# Patient Record
Sex: FEMALE | Race: WHITE | NOT HISPANIC OR LATINO | Employment: PART TIME | URBAN - METROPOLITAN AREA
[De-identification: names, ages, dates, MRNs, and addresses within clinical notes are randomized per-mention and may not be internally consistent; named-entity substitution may affect disease eponyms.]

---

## 2017-11-01 ENCOUNTER — TRANSCRIBE ORDERS (OUTPATIENT)
Dept: ADMINISTRATIVE | Facility: HOSPITAL | Age: 72
End: 2017-11-01

## 2017-11-01 DIAGNOSIS — Z78.0 POSTMENOPAUSAL: ICD-10-CM

## 2017-11-01 DIAGNOSIS — Z12.31 VISIT FOR SCREENING MAMMOGRAM: Primary | ICD-10-CM

## 2017-12-20 ENCOUNTER — HOSPITAL ENCOUNTER (OUTPATIENT)
Dept: BONE DENSITY | Facility: HOSPITAL | Age: 72
Discharge: HOME | End: 2017-12-20

## 2017-12-20 ENCOUNTER — HOSPITAL ENCOUNTER (OUTPATIENT)
Dept: MAMMOGRAPHY | Facility: HOSPITAL | Age: 72
Discharge: HOME | End: 2017-12-20
Admitting: PHYSICIAN ASSISTANT

## 2017-12-20 DIAGNOSIS — Z12.31 VISIT FOR SCREENING MAMMOGRAM: ICD-10-CM

## 2017-12-20 DIAGNOSIS — Z78.0 POSTMENOPAUSAL: ICD-10-CM

## 2017-12-20 PROCEDURE — 77063 BREAST TOMOSYNTHESIS BI: CPT

## 2017-12-20 PROCEDURE — 77080 DXA BONE DENSITY AXIAL: CPT

## 2017-12-20 PROCEDURE — G0202 SCR MAMMO BI INCL CAD: HCPCS

## 2017-12-20 PROCEDURE — 77063 BREAST TOMOSYNTHESIS BI: CPT | Performed by: RADIOLOGY

## 2017-12-20 PROCEDURE — G0202 SCR MAMMO BI INCL CAD: HCPCS | Performed by: RADIOLOGY

## 2018-11-09 ENCOUNTER — TRANSCRIBE ORDERS (OUTPATIENT)
Dept: ADMINISTRATIVE | Facility: HOSPITAL | Age: 73
End: 2018-11-09

## 2018-11-09 DIAGNOSIS — Z12.31 VISIT FOR SCREENING MAMMOGRAM: Primary | ICD-10-CM

## 2019-01-09 ENCOUNTER — HOSPITAL ENCOUNTER (OUTPATIENT)
Dept: MAMMOGRAPHY | Facility: HOSPITAL | Age: 74
Discharge: HOME | End: 2019-01-09
Attending: INTERNAL MEDICINE | Admitting: INTERNAL MEDICINE

## 2019-01-09 DIAGNOSIS — Z12.31 VISIT FOR SCREENING MAMMOGRAM: ICD-10-CM

## 2019-01-09 PROCEDURE — 77063 BREAST TOMOSYNTHESIS BI: CPT

## 2019-01-09 PROCEDURE — 77067 SCR MAMMO BI INCL CAD: CPT | Performed by: RADIOLOGY

## 2019-01-09 PROCEDURE — 77067 SCR MAMMO BI INCL CAD: CPT

## 2019-01-09 PROCEDURE — 77063 BREAST TOMOSYNTHESIS BI: CPT | Performed by: RADIOLOGY

## 2019-03-13 RX ORDER — SIMVASTATIN 20 MG
TABLET ORAL
Qty: 90 TABLET | Refills: 3 | Status: SHIPPED | OUTPATIENT
Start: 2019-03-13 | End: 2020-05-28 | Stop reason: SDUPTHER

## 2019-05-06 PROBLEM — H61.22 LEFT EAR IMPACTED CERUMEN: Status: ACTIVE | Noted: 2019-05-06

## 2019-05-06 PROBLEM — G47.00 INSOMNIA, UNSPECIFIED: Status: ACTIVE | Noted: 2019-05-06

## 2019-05-06 PROBLEM — F10.90 ALCOHOL USE: Status: ACTIVE | Noted: 2019-05-06

## 2019-05-06 PROBLEM — M81.0 OSTEOPOROSIS: Status: ACTIVE | Noted: 2019-05-06

## 2019-05-06 PROBLEM — E78.5 HYPERLIPIDEMIA: Status: ACTIVE | Noted: 2019-05-06

## 2019-05-06 PROBLEM — M19.90 OSTEOARTHRITIS: Status: ACTIVE | Noted: 2019-05-06

## 2019-05-06 PROBLEM — Z12.31 SCREENING MAMMOGRAM, ENCOUNTER FOR: Status: ACTIVE | Noted: 2019-05-06

## 2019-05-06 PROBLEM — Z78.0 POST-MENOPAUSAL: Status: ACTIVE | Noted: 2019-05-06

## 2019-05-06 PROBLEM — M15.9 PRIMARY OSTEOARTHRITIS INVOLVING MULTIPLE JOINTS: Status: ACTIVE | Noted: 2019-05-06

## 2019-05-06 PROBLEM — H26.9 EARLY CATARACT: Status: ACTIVE | Noted: 2019-05-06

## 2019-05-06 PROBLEM — I10 ESSENTIAL HYPERTENSION: Status: ACTIVE | Noted: 2019-05-06

## 2019-05-06 PROBLEM — M40.209 KYPHOSIS, ACQUIRED: Status: ACTIVE | Noted: 2019-05-06

## 2019-05-06 PROBLEM — K63.5 POLYP OF COLON: Status: ACTIVE | Noted: 2019-05-06

## 2019-05-06 PROBLEM — E55.9 VITAMIN D DEFICIENCY: Status: ACTIVE | Noted: 2019-05-06

## 2019-05-06 PROBLEM — M15.0 PRIMARY OSTEOARTHRITIS INVOLVING MULTIPLE JOINTS: Status: ACTIVE | Noted: 2019-05-06

## 2019-05-06 PROBLEM — B39.9 HISTOPLASMOSIS: Status: ACTIVE | Noted: 2019-05-06

## 2019-05-06 PROBLEM — Z00.00 MEDICARE ANNUAL WELLNESS VISIT, SUBSEQUENT: Status: ACTIVE | Noted: 2019-05-06

## 2019-05-06 PROBLEM — Z78.9 ALCOHOL USE: Status: ACTIVE | Noted: 2019-05-06

## 2019-05-06 PROBLEM — M85.80 OSTEOPENIA: Status: ACTIVE | Noted: 2019-05-06

## 2019-05-22 ENCOUNTER — OFFICE VISIT (OUTPATIENT)
Dept: INTERNAL MEDICINE | Facility: CLINIC | Age: 74
End: 2019-05-22

## 2019-05-22 ENCOUNTER — LAB REQUISITION (OUTPATIENT)
Dept: LAB | Facility: HOSPITAL | Age: 74
End: 2019-05-22

## 2019-05-22 VITALS
SYSTOLIC BLOOD PRESSURE: 140 MMHG | HEIGHT: 64 IN | BODY MASS INDEX: 22.71 KG/M2 | HEART RATE: 72 BPM | DIASTOLIC BLOOD PRESSURE: 80 MMHG | WEIGHT: 133 LBS

## 2019-05-22 DIAGNOSIS — R10.30 LOWER ABDOMINAL PAIN: Primary | ICD-10-CM

## 2019-05-22 DIAGNOSIS — Z00.00 ROUTINE GENERAL MEDICAL EXAMINATION AT A HEALTH CARE FACILITY: ICD-10-CM

## 2019-05-22 DIAGNOSIS — E78.2 MIXED HYPERLIPIDEMIA: ICD-10-CM

## 2019-05-22 DIAGNOSIS — I10 ESSENTIAL HYPERTENSION: ICD-10-CM

## 2019-05-22 PROCEDURE — 36415 COLL VENOUS BLD VENIPUNCTURE: CPT | Performed by: INTERNAL MEDICINE

## 2019-05-22 PROCEDURE — 99214 OFFICE O/P EST MOD 30 MIN: CPT | Performed by: INTERNAL MEDICINE

## 2019-05-22 RX ORDER — MELATONIN
1000 DAILY
COMMUNITY

## 2019-05-22 RX ORDER — MULTIPLE VITAMINS W/ MINERALS TAB 9MG-400MCG
1 TAB ORAL DAILY
COMMUNITY

## 2019-05-22 RX ORDER — LISINOPRIL 20 MG/1
20 TABLET ORAL DAILY
COMMUNITY
End: 2020-01-30

## 2019-05-22 NOTE — ASSESSMENT & PLAN NOTE
.  Patient has a history of mixed hyperlipidemia on simvastatin 20 mg daily denies myalgia or arthralgia continue current therapy lab pending

## 2019-05-22 NOTE — PROGRESS NOTES
[facility]     Lucia Mc  1945   [id number]      Patient Care Team:  Wei Skelton MD as PCP - General  Wei Skelton MD as PCP - Family Medicine    Chief Complaint::   Chief Complaint   Patient presents with   • abdominal discomfort     bloating.  Denies change in stools.             HPI  Patient is a 24-year-old female complains of low abdominal fullness bloating discomfort the past 2 to 3 months is noted no change in her bowel habits consistency of the bowel movement no change in bladder or GYN function he is remote appendectomy and  x2 she has gained a 3 to 4 pounds over the last few months injury colonoscopy was 2011-normal.  In addition the patient has a history of essential hypertension good control of mixed hyperlipidemia good control.      Patient Active Problem List   Diagnosis   • BMI 22.0-22.9, adult   • Insomnia, unspecified   • Osteoarthritis   • Post-menopausal   • Screening mammogram, encounter for   • Osteoporosis   • Hyperlipidemia   • Primary osteoarthritis involving multiple joints   • Early cataract   • Essential hypertension   • Vitamin D deficiency   • Left ear impacted cerumen   • Osteopenia   • Medicare annual wellness visit, subsequent   • Polyp of colon   • Alcohol use   • Kyphosis, acquired   • Histoplasmosis   • Lower abdominal pain        No past medical history on file.    Past Surgical History:   Procedure Laterality Date   • APPENDECTOMY     •  SECTION      TIMES 2   • COLONOSCOPY  2009   • OVARIAN CYST SURGERY     • TUBAL ABDOMINAL LIGATION Bilateral        Family History   Problem Relation Age of Onset   • Breast cancer Maternal Aunt 70   • Pancreatic cancer Other    • COPD Other    • Diabetes Other    • Ovarian cancer Neg Hx        Social History     Socioeconomic History   • Marital status:      Spouse name: Not on file   • Number of children: Not on file   • Years of education: Not on file   • Highest education level:  "Not on file   Tobacco Use   • Smoking status: Former Smoker     Last attempt to quit: 1964     Years since quittin.4   • Tobacco comment: 1 CIGARETTES         No Known Allergies    Review of Systems     HEENT: Denies headache dizzy ear nose mouth or throat difficulties does have maturing cataracts surgery this fall  NECK: Denies pain stiffness swelling dysphasia or reflux  CHEST: Denies cough or wheeze denies recent pulmonary infections  CARDIAC: Denies chest pain or pressure  ABD: Denies upper abdominal discomfort pain nausea or vomiting  : Dysuria frequency  NEURO: Denies syncope concussion or neuropathy  PSYCH: Denies anxiety depression  EXTREM: Complains of arthritic soreness mild without edema    Vital Signs  Vitals:    19 1110   BP: 140/80   Pulse: 72   Weight: 60.3 kg (133 lb)   Height: 162.6 cm (64\")   PainSc:   2     Body mass index is 22.83 kg/m².      Current Outpatient Medications:   •  cholecalciferol (VITAMIN D3) 1000 units tablet, Take 1,000 Units by mouth Daily., Disp: , Rfl:   •  lisinopril (PRINIVIL,ZESTRIL) 20 MG tablet, Take 20 mg by mouth Daily., Disp: , Rfl:   •  Multiple Vitamins-Minerals (MULTIVITAMIN WITH MINERALS) tablet tablet, Take 1 tablet by mouth Daily., Disp: , Rfl:   •  simvastatin (ZOCOR) 20 MG tablet, TAKE 1 TABLET EVERY EVENING, Disp: 90 tablet, Rfl: 3    Physical Exam     ACE III MINI         HEENT: Pupils equal reactive ENT clear no facial asymmetry  NECK: No masses bruits or thyromegaly megaly or neck vein distention  CHEST: Clear to P&A  CARDIAC: Regular rhythm without gallop or rub  ABD: Liver and spleen normal positive bowel sounds no bruits no masses guarding or rebound  :   NEURO: CNS is intact no neuropathy  PSYCH: No evidence of anxiety depression  EXTREM: Minimal arthritic changes no edema  Skin: Clear     Results Review:    CBC CMP and lipid panel are pending  Procedures    Medication Review: Medications reviewed and noted    Patient wellness " counseling  Exercise: Continue walking exercise  Diet: Continue healthy cardiac diet  Smoking: Non-smoker  Alcohol: Infrequent alcohol  Screening: We will arrange GYN colonoscopy and CT    Assessment/Plan:    Problem List Items Addressed This Visit        Cardiovascular and Mediastinum    Hyperlipidemia    Current Assessment & Plan     .  Patient has a history of mixed hyperlipidemia on simvastatin 20 mg daily denies myalgia or arthralgia continue current therapy lab pending         Relevant Medications    simvastatin (ZOCOR) 20 MG tablet    Other Relevant Orders    Lipid Panel    Essential hypertension    Current Assessment & Plan     Patient has a history of essential hypertension currently stable on lisinopril 20 mg daily denies headache or cough to continue therapy blood pressure repeated 134/76 left and right sitting         Relevant Medications    lisinopril (PRINIVIL,ZESTRIL) 20 MG tablet    Other Relevant Orders    CBC & Differential    Comprehensive Metabolic Panel       Nervous and Auditory    Lower abdominal pain - Primary    Overview     She is noted the last 2 to 3 months some lower abdominal pain with bloating and fullness feels full diet medication or activity she has gained 3 to 4 pounds over the past few months she is remote  section x2 and appendectomy her last colonoscopy was by Dr. Bauer 2010-normal denies change in the quality of her bowel movements denies bladder problems GYN problems.         Current Assessment & Plan     Exam of abdomen is normal.  No guarding masses rebound or tenderness.  We will obtain lab BC CMP obtain CT scan of abdomen pelvis with and without colonoscopy and GYN exam consultation  and  are best she continues to work through  at [employer]         Relevant Orders    CT Abdomen Pelvis With Contrast    Ambulatory Referral to Gastroenterology    Ambulatory Referral to Obstetrics / Gynecology           Patient Instructions   Lab work  of CBC CMP and lipid are pending  Arrange colonoscopy by Dr. Jose Bauer at all  Arrange GYN appointment with [facility]  Arrange CT scan of abdomen pelvis with and without  Follow-up visit in 2 months she continues to work at [employer] in [place] through the month of June but is available for these consultations on Mondays and Wednesdays         Plan of care reviewed with patient at the conclusion of today's visit. Education was provided regarding diagnosis, management, and any prescribed or recommended OTC medications.Patient verbalizes understanding of and agreement with management plan.         Wei Skelton MD      Note: Part of this note may be an electronic transcription/translation of spoken language to printed text using the Dragon Dictation system.

## 2019-05-22 NOTE — ASSESSMENT & PLAN NOTE
Exam of abdomen is normal.  No guarding masses rebound or tenderness.  We will obtain lab BC CMP obtain CT scan of abdomen pelvis with and without colonoscopy and GYN exam consultation Mondays and Wednesdays are best she continues to work through June at [employer]

## 2019-05-22 NOTE — ASSESSMENT & PLAN NOTE
Patient has a history of essential hypertension currently stable on lisinopril 20 mg daily denies headache or cough to continue therapy blood pressure repeated 134/76 left and right sitting

## 2019-05-23 LAB
ALBUMIN SERPL-MCNC: 4.4 G/DL (ref 3.5–5.2)
ALBUMIN/GLOB SERPL: 1.8 G/DL
ALP SERPL-CCNC: 83 U/L (ref 39–117)
ALT SERPL-CCNC: 15 U/L (ref 1–33)
AST SERPL-CCNC: 22 U/L (ref 1–32)
BASOPHILS # BLD AUTO: 0.05 10*3/MM3 (ref 0–0.2)
BASOPHILS NFR BLD AUTO: 1 % (ref 0–1.5)
BILIRUB SERPL-MCNC: 0.6 MG/DL (ref 0.2–1.2)
BUN SERPL-MCNC: 12 MG/DL (ref 8–23)
BUN/CREAT SERPL: 16.4 (ref 7–25)
CALCIUM SERPL-MCNC: 10.3 MG/DL (ref 8.6–10.5)
CHLORIDE SERPL-SCNC: 101 MMOL/L (ref 98–107)
CHOLEST SERPL-MCNC: 195 MG/DL (ref 0–200)
CO2 SERPL-SCNC: 28.8 MMOL/L (ref 22–29)
CREAT SERPL-MCNC: 0.73 MG/DL (ref 0.57–1)
EOSINOPHIL # BLD AUTO: 0.1 10*3/MM3 (ref 0–0.4)
EOSINOPHIL NFR BLD AUTO: 1.9 % (ref 0.3–6.2)
ERYTHROCYTE [DISTWIDTH] IN BLOOD BY AUTOMATED COUNT: 12.9 % (ref 12.3–15.4)
GLOBULIN SER CALC-MCNC: 2.4 GM/DL
GLUCOSE SERPL-MCNC: 93 MG/DL (ref 65–99)
HCT VFR BLD AUTO: 39.3 % (ref 34–46.6)
HDLC SERPL-MCNC: 101 MG/DL (ref 40–60)
HGB BLD-MCNC: 12.5 G/DL (ref 12–15.9)
IMM GRANULOCYTES # BLD AUTO: 0.01 10*3/MM3 (ref 0–0.05)
IMM GRANULOCYTES NFR BLD AUTO: 0.2 % (ref 0–0.5)
LDLC SERPL CALC-MCNC: 80 MG/DL (ref 0–100)
LYMPHOCYTES # BLD AUTO: 1.57 10*3/MM3 (ref 0.7–3.1)
LYMPHOCYTES NFR BLD AUTO: 30 % (ref 19.6–45.3)
MCH RBC QN AUTO: 31.5 PG (ref 26.6–33)
MCHC RBC AUTO-ENTMCNC: 31.8 G/DL (ref 31.5–35.7)
MCV RBC AUTO: 99 FL (ref 79–97)
MONOCYTES # BLD AUTO: 0.52 10*3/MM3 (ref 0.1–0.9)
MONOCYTES NFR BLD AUTO: 9.9 % (ref 5–12)
NEUTROPHILS # BLD AUTO: 2.98 10*3/MM3 (ref 1.7–7)
NEUTROPHILS NFR BLD AUTO: 57 % (ref 42.7–76)
NRBC BLD AUTO-RTO: 0 /100 WBC (ref 0–0.2)
PLATELET # BLD AUTO: 229 10*3/MM3 (ref 140–450)
POTASSIUM SERPL-SCNC: 4.6 MMOL/L (ref 3.5–5.2)
PROT SERPL-MCNC: 6.8 G/DL (ref 6–8.5)
RBC # BLD AUTO: 3.97 10*6/MM3 (ref 3.77–5.28)
SODIUM SERPL-SCNC: 141 MMOL/L (ref 136–145)
TRIGL SERPL-MCNC: 70 MG/DL (ref 0–150)
VLDLC SERPL CALC-MCNC: 14 MG/DL
WBC # BLD AUTO: 5.23 10*3/MM3 (ref 3.4–10.8)

## 2019-05-23 NOTE — PATIENT INSTRUCTIONS
Lab work of CBC CMP and lipid are pending  Arrange colonoscopy by Dr. Jose Bauer at all  Arrange GYN appointment with [facility]  Arrange CT scan of abdomen pelvis with and without  Follow-up visit in 2 months she continues to work at [employer] in [place] through the month of June but is available for these consultations on Mondays and Wednesdays

## 2019-06-10 ENCOUNTER — HOSPITAL ENCOUNTER (OUTPATIENT)
Dept: CT IMAGING | Facility: HOSPITAL | Age: 74
Discharge: HOME | End: 2019-06-10
Admitting: INTERNAL MEDICINE

## 2019-06-10 DIAGNOSIS — Z12.11 SCREENING FOR COLON CANCER: Primary | ICD-10-CM

## 2019-06-10 DIAGNOSIS — R10.30 LOWER ABDOMINAL PAIN: ICD-10-CM

## 2019-06-10 PROCEDURE — 25010000002 IOPAMIDOL 61 % SOLUTION: Performed by: INTERNAL MEDICINE

## 2019-06-10 PROCEDURE — 74178 CT ABD&PLV WO CNTR FLWD CNTR: CPT

## 2019-06-10 RX ADMIN — IOPAMIDOL 85 ML: 612 INJECTION, SOLUTION INTRAVENOUS at 15:47

## 2019-06-12 ENCOUNTER — OFFICE VISIT (OUTPATIENT)
Dept: OBSTETRICS AND GYNECOLOGY | Facility: CLINIC | Age: 74
End: 2019-06-12

## 2019-06-12 VITALS
WEIGHT: 131 LBS | BODY MASS INDEX: 22.36 KG/M2 | HEIGHT: 64 IN | SYSTOLIC BLOOD PRESSURE: 160 MMHG | DIASTOLIC BLOOD PRESSURE: 90 MMHG

## 2019-06-12 DIAGNOSIS — Z01.419 ENCNTR FOR GYN EXAM (GENERAL) (ROUTINE) W/O ABN FINDINGS: Primary | ICD-10-CM

## 2019-06-12 PROCEDURE — G0101 CA SCREEN;PELVIC/BREAST EXAM: HCPCS | Performed by: OBSTETRICS & GYNECOLOGY

## 2019-06-12 RX ORDER — SODIUM, POTASSIUM,MAG SULFATES 17.5-3.13G
SOLUTION, RECONSTITUTED, ORAL ORAL
COMMUNITY
Start: 2019-06-10 | End: 2019-07-22

## 2019-06-12 NOTE — PROGRESS NOTES
Subjective   Chief Complaint   Patient presents with   • Gynecologic Exam     annual     Lucia Mc is a 74 y.o. year old  menopausal female presenting to be seen for her annual exam.  This past year she has not been on hormone replacement therapy.  There has not been vaginal bleeding in the last 12 months.  Menopausal symptoms are not present. Last mammogram: 2019. Last DEXA: 2018. Colonoscopy scheduled for next week. Participates in ovarian screening at : 2018. Patient denies history of abnormal pap smears. Patient had a recent CT scan for abdominal bloating with normal imaging.    SEXUAL Hx:  She is not currently sexually active.  In the past year there she has not been sexually active.    Condoms are not needed because she is not sexually active.  She would not like to be screened for STD's at today's exam.  [place] is painful: not asked  HEALTH Hx:  She exercises regularly: yes.  She wears her seat belt: yes.  She has concerns about domestic violence: no.  OTHER THINGS SHE WANTS TO DISCUSS TODAY:  Nothing else    The following portions of the patient's history were reviewed and updated as appropriate:problem list, current medications, allergies, past family history, past medical history, past social history and past surgical history.    Social History    Tobacco Use      Smoking status: Former Smoker        Quit date: 1964        Years since quittin.4      Smokeless tobacco: Never Used      Tobacco comment: 1 CIGARETTES        Review of Systems  Constitutional POS: nothing reported    NEG: anorexia or night sweats   Genitourinary POS: nothing reported    NEG: dysuria or hematuria      Gastointestinal POS: constipation (chronic)    NEG: bloating, change in bowel habits, melena or reflux symptoms   Integument POS: nothing reported    NEG: moles that are changing in size, shape, color or rashes   Breast POS: nothing reported    NEG: persistent breast lump, skin dimpling or nipple  "discharge        Objective   /90   Ht 162.6 cm (64\")   Wt 59.4 kg (131 lb)   LMP  (LMP Unknown)   Breastfeeding? No   BMI 22.49 kg/m²     General:  well developed; well nourished  no acute distress   Skin:  No suspicious lesions seen   Thyroid: normal to inspection and palpation   Breasts:  Examined in supine position  Symmetric without masses or skin dimpling  Nipples normal without inversion, lesions or discharge  There are no palpable axillary nodes   Abdomen: soft, non-tender; no masses  no umbilical or inguinal hernias are present  no hepato-splenomegaly   Pelvis: Exam limited by  patient's compliance  Clinical staff was present for exam  External genitalia:  normal appearance of the external genitalia including [place]'s and [place]'s glands.  :  urethral meatus normal;  Vaginal:  atrophic mucosal changes are present;  Cervix:  normal appearance.  Uterus:  normal size, shape and consistency.  Adnexa:  non palpable bilaterally.        Assessment   1. Normal GYN in postmenopausal female  2. She is up to date on all relevant gynecologic and colorectal screenings except colon cancer screening     Plan   1. Pap was not done today.  I explained to Lucia that the Pap smears are no longer recommended in patient's after 65 years of age.   I stressed to Lucia that she still should be seen to be seen yearly for a full physical including breast and pelvic exam.  2. Colonoscopy scheduled for next week.  3. The importance of keeping all planned follow-up and taking all medications as prescribed was emphasized.  4. Follow up for annual exam in 1 year.    No orders of the defined types were placed in this encounter.         This note was electronically signed.    Alanna Benitez, DO  June 12, 2019    Note: Speech recognition transcription software may have been used to create portions of this document.  An attempt at proofreading has been made but errors in transcription could still be present.  "

## 2019-06-17 ENCOUNTER — OUTSIDE FACILITY SERVICE (OUTPATIENT)
Dept: GASTROENTEROLOGY | Facility: CLINIC | Age: 74
End: 2019-06-17

## 2019-06-17 PROCEDURE — G0500 MOD SEDAT ENDO SERVICE >5YRS: HCPCS | Performed by: INTERNAL MEDICINE

## 2019-06-17 PROCEDURE — G0105 COLORECTAL SCRN; HI RISK IND: HCPCS | Performed by: INTERNAL MEDICINE

## 2019-07-22 ENCOUNTER — OFFICE VISIT (OUTPATIENT)
Dept: INTERNAL MEDICINE | Facility: CLINIC | Age: 74
End: 2019-07-22

## 2019-07-22 VITALS
BODY MASS INDEX: 22.36 KG/M2 | WEIGHT: 131 LBS | SYSTOLIC BLOOD PRESSURE: 138 MMHG | DIASTOLIC BLOOD PRESSURE: 66 MMHG | HEART RATE: 84 BPM | HEIGHT: 64 IN

## 2019-07-22 DIAGNOSIS — I10 ESSENTIAL HYPERTENSION: ICD-10-CM

## 2019-07-22 DIAGNOSIS — H26.9 CATARACT OF LEFT EYE, UNSPECIFIED CATARACT TYPE: ICD-10-CM

## 2019-07-22 DIAGNOSIS — E78.2 MIXED HYPERLIPIDEMIA: ICD-10-CM

## 2019-07-22 DIAGNOSIS — K58.9 IRRITABLE BOWEL SYNDROME, UNSPECIFIED TYPE: Primary | ICD-10-CM

## 2019-07-22 PROCEDURE — 99213 OFFICE O/P EST LOW 20 MIN: CPT | Performed by: INTERNAL MEDICINE

## 2019-07-22 NOTE — ASSESSMENT & PLAN NOTE
History of mixed hyperlipidemia on simvastatin 20 mg denies myalgia arthralgia continue current therapy

## 2019-07-22 NOTE — ASSESSMENT & PLAN NOTE
Recent GI work-up including endoscopy GYN referral medication adjustments and changes including probiotic and fiber patient is improved.

## 2019-07-22 NOTE — ASSESSMENT & PLAN NOTE
History of essential hypertension on lisinopril 20 mg daily denies headache cough recent lab is normal continue current therapy with blood pressure 138/66 left and right arm

## 2019-07-22 NOTE — PATIENT INSTRUCTIONS
Continue current therapy including fiber and probiotic  Follow-up with Dr. Dove regards cataract and cataract extraction  Return visit in 4 months or as needed

## 2019-07-22 NOTE — PROGRESS NOTES
[facility]     Lucia Mc  1945   [id number]      Patient Care Team:  Wei Skelton MD as PCP - General  Wei Skelton MD as PCP - Family Medicine    Chief Complaint::   Chief Complaint   Patient presents with   • Cataract     left by Dr. Dove in [place]   • abd discomfort     follow up, improved          HPI  Patient is a 75 yo female presenting with cataract and abdominal discomfort follow-up. She has started a probiotic that she thinks has helped with her abdominal pain. She is also taking benefiber in the morning. She still has occasional epigastric pain but so far has been much improved. She stays away from nuts and popcorn and spicy foods. She will have cataract surgery in about 3 weeks. She drinks coffee in the morning. She drinks water and green tea, not much pop.      Patient Active Problem List   Diagnosis   • Insomnia, unspecified   • Post-menopausal   • Screening mammogram, encounter for   • Osteoporosis   • Hyperlipidemia   • Primary osteoarthritis involving multiple joints   • Early cataract   • Essential hypertension   • Vitamin D deficiency   • Left ear impacted cerumen   • Osteopenia   • Medicare annual wellness visit, subsequent   • Polyp of colon   • Alcohol use   • Kyphosis, acquired   • Histoplasmosis   • Lower abdominal pain   • Irritable bowel syndrome   • Cataract of left eye        Past Medical History:   Diagnosis Date   • Arthritis    • Hyperlipidemia    • Hypertension    • Ovarian cyst        Past Surgical History:   Procedure Laterality Date   • APPENDECTOMY     •  SECTION     • COLONOSCOPY  2009   • OVARIAN CYST SURGERY     • TUBAL ABDOMINAL LIGATION Bilateral        Family History   Problem Relation Age of Onset   • Breast cancer Maternal Aunt 70   • Pancreatic cancer Other    • COPD Other    • Diabetes Other    • Ovarian cancer Neg Hx    • Uterine cancer Neg Hx    • Endometrial cancer Neg Hx    • Colon cancer Neg Hx        Social  "History     Socioeconomic History   • Marital status:      Spouse name: Not on file   • Number of children: Not on file   • Years of education: Not on file   • Highest education level: Not on file   Tobacco Use   • Smoking status: Former Smoker     Last attempt to quit: 1964     Years since quittin.5   • Smokeless tobacco: Never Used   • Tobacco comment: 1 CIGARETTES     Substance and Sexual Activity   • Alcohol use: Yes     Frequency: 4 or more times a week   • Drug use: No   • Sexual activity: No     Birth control/protection: Other       No Known Allergies    Review of Systems   Constitutional: Negative for chills, fatigue and fever.   HENT: Negative for congestion, ear pain and sinus pressure.    Respiratory: Negative for cough, chest tightness, shortness of breath and wheezing.    Cardiovascular: Negative for chest pain and palpitations.   Gastrointestinal: Positive for abdominal pain (improved. only occasional now). Negative for blood in stool and constipation.   Musculoskeletal: Positive for back pain.   Skin: Negative for color change.   Allergic/Immunologic: Negative for environmental allergies.   Neurological: Negative for dizziness, speech difficulty and headache.   Psychiatric/Behavioral: Negative for decreased concentration. The patient is not nervous/anxious.        Vital Signs  Vitals:    19 1338 19 1400   BP: 130/72 138/66   BP Location: Right arm Right arm   Patient Position: Sitting Sitting   Cuff Size: Adult Adult   Pulse: 84    Weight: 59.4 kg (131 lb)    Height: 162.6 cm (64\")    PainSc:   2      Body mass index is 22.49 kg/m².      Current Outpatient Medications:   •  cholecalciferol (VITAMIN D3) 1000 units tablet, Take 1,000 Units by mouth Daily., Disp: , Rfl:   •  lisinopril (PRINIVIL,ZESTRIL) 20 MG tablet, Take 20 mg by mouth Daily., Disp: , Rfl:   •  Multiple Vitamins-Minerals (MULTIVITAMIN WITH MINERALS) tablet tablet, Take 1 tablet by mouth Daily., Disp: , Rfl: "   •  Probiotic Product (PROBIOTIC-10) capsule, Take  by mouth Daily., Disp: , Rfl:   •  simvastatin (ZOCOR) 20 MG tablet, TAKE 1 TABLET EVERY EVENING, Disp: 90 tablet, Rfl: 3    Physical Exam   Constitutional: She is oriented to person, place, and time.   HENT:   Head: Normocephalic.   Nose: Nose normal.   Eyes: Conjunctivae are normal. Pupils are equal, round, and reactive to light.   Neck: Normal range of motion. Carotid bruit is not present. No thyroid mass and no thyromegaly present.   Cardiovascular: Normal rate, regular rhythm and normal heart sounds.   No murmur heard.  Pulmonary/Chest: Effort normal and breath sounds normal.   Abdominal: Soft. Bowel sounds are normal. There is no splenomegaly or hepatomegaly. There is no tenderness.   Musculoskeletal: Normal range of motion. She exhibits no edema.     Vascular Status -  Her right foot exhibits normal foot vasculature  and no edema. Her left foot exhibits normal foot vasculature  and no edema.  Neurological: She is alert and oriented to person, place, and time.   Skin: Skin is warm and dry.   Psychiatric: She has a normal mood and affect. Her behavior is normal.        ACE III MINI        Results Review:    Recent lab and endoscopy and consultations discussed  Procedures    Medication Review: Medications reviewed and noted    Patient wellness counseling  Exercise: Continue walking exercise  Diet: Continue healthy heart diet with increased fiber and probiotic  Smoking: Not current smoker  Alcohol: Infrequent alcohol  Screening:    Assessment/Plan:    Problem List Items Addressed This Visit        Cardiovascular and Mediastinum    Hyperlipidemia    Current Assessment & Plan     History of mixed hyperlipidemia on simvastatin 20 mg denies myalgia arthralgia continue current therapy         Relevant Medications    simvastatin (ZOCOR) 20 MG tablet    Essential hypertension    Current Assessment & Plan     History of essential hypertension on lisinopril 20 mg daily  denies headache cough recent lab is normal continue current therapy with blood pressure 138/66 left and right arm         Relevant Medications    lisinopril (PRINIVIL,ZESTRIL) 20 MG tablet       Digestive    Irritable bowel syndrome - Primary    Overview     Patient had a recent CT scan of abdomen pelvis, colonoscopy, EGD, and GYN referral no significant pathology was found patient is improved she is on probiotic she is on fiber she does feel better.         Current Assessment & Plan     Recent GI work-up including endoscopy GYN referral medication adjustments and changes including probiotic and fiber patient is improved.            Other    Cataract of left eye    Current Assessment & Plan     Patient has symptomatic cataract left eye for cataract extraction within the next month.  Dr. Dove in [place] his ophthalmologist              Patient Instructions   Continue current therapy including fiber and probiotic  Follow-up with Dr. Dove regards cataract and cataract extraction  Return visit in 4 months or as needed       Plan of care reviewed with patient at the conclusion of today's visit. Education was provided regarding diagnosis, management, and any prescribed or recommended OTC medications.Patient verbalizes understanding of and agreement with management plan.         Wei Skelton MD      Note: Part of this note may be an electronic transcription/translation of spoken language to printed text using the Dragon Dictation system.

## 2019-10-25 ENCOUNTER — TELEPHONE (OUTPATIENT)
Dept: INTERNAL MEDICINE | Facility: CLINIC | Age: 74
End: 2019-10-25

## 2019-10-25 DIAGNOSIS — E55.9 VITAMIN D DEFICIENCY: Primary | ICD-10-CM

## 2019-10-25 DIAGNOSIS — E78.2 MIXED HYPERLIPIDEMIA: ICD-10-CM

## 2019-10-25 DIAGNOSIS — I10 ESSENTIAL HYPERTENSION: ICD-10-CM

## 2019-10-25 NOTE — TELEPHONE ENCOUNTER
Pt would like to complete her bloodwork before her 11/25/2019 follow up office visit with Dr. Skelton. Can we put her orders in so that she can complete them due to her appt time at 3:45?    Ifeoma can be reached at [phone].

## 2019-11-04 ENCOUNTER — OFFICE VISIT (OUTPATIENT)
Dept: INTERNAL MEDICINE | Facility: CLINIC | Age: 74
End: 2019-11-04

## 2019-11-04 ENCOUNTER — LAB (OUTPATIENT)
Dept: LAB | Facility: HOSPITAL | Age: 74
End: 2019-11-04

## 2019-11-04 VITALS
DIASTOLIC BLOOD PRESSURE: 82 MMHG | SYSTOLIC BLOOD PRESSURE: 126 MMHG | WEIGHT: 131 LBS | BODY MASS INDEX: 22.36 KG/M2 | HEIGHT: 64 IN | HEART RATE: 76 BPM

## 2019-11-04 DIAGNOSIS — Z00.00 WELL ADULT EXAM: Primary | ICD-10-CM

## 2019-11-04 DIAGNOSIS — E55.9 VITAMIN D DEFICIENCY: ICD-10-CM

## 2019-11-04 DIAGNOSIS — E78.2 MIXED HYPERLIPIDEMIA: ICD-10-CM

## 2019-11-04 DIAGNOSIS — I10 ESSENTIAL HYPERTENSION: ICD-10-CM

## 2019-11-04 PROCEDURE — G0008 ADMIN INFLUENZA VIRUS VAC: HCPCS | Performed by: PHYSICIAN ASSISTANT

## 2019-11-04 PROCEDURE — 90653 IIV ADJUVANT VACCINE IM: CPT | Performed by: PHYSICIAN ASSISTANT

## 2019-11-04 PROCEDURE — G0439 PPPS, SUBSEQ VISIT: HCPCS | Performed by: PHYSICIAN ASSISTANT

## 2019-11-04 NOTE — PATIENT INSTRUCTIONS
Medicare Wellness  Personal Prevention Plan of Service     Date of Office Visit:  2019  Encounter Provider:  Bri Bo PA-C  Place of Service:  [facility]  Patient Name: Lucia Mc  :  1945    As part of the Medicare Wellness portion of your visit today, we are providing you with this personalized preventive plan of services (PPPS). This plan is based upon recommendations of the [place] Preventive Services Task Force (USPSTF) and the Advisory Committee on Immunization Practices (ACIP).    This lists the preventive care services that should be considered, and provides dates of when you are due. Items listed as completed are up-to-date and do not require any further intervention.    Health Maintenance   Topic Date Due   • ZOSTER VACCINE (2 of 3) 10/10/2016   • INFLUENZA VACCINE  2019   • MEDICARE ANNUAL WELLNESS  2019   • DXA SCAN  2019   • LIPID PANEL  2020   • MAMMOGRAM  2021   • COLONOSCOPY  2024   • TDAP/TD VACCINES (2 - Td) 2025   • PNEUMOCOCCAL VACCINES (65+ LOW/MEDIUM RISK)  Completed       Orders Placed This Encounter   Procedures   • Fluad Tri 65yr+       Return for Next scheduled follow up, labs today.

## 2019-11-04 NOTE — PROGRESS NOTES
Subsequent Medicare Wellness Visit   The ABC's of the Annual Wellness Visit    Chief Complaint   Patient presents with   • Medicare Wellness-subsequent     She is fasting       HPI:  Lucia Mc, -1945, is a 74 y.o. female who presents for a Subsequent Medicare Wellness Visit.    Recent Hospitalizations:  No hospitalization(s) within the last year..    Current Medical Providers:  Patient Care Team:  Wei Skelton MD as PCP - General  Wei Skelton MD as PCP - Family Medicine  Reginald Dove MD as Consulting Physician (Ophthalmology)  Sammy Delvalle MD as Consulting Physician (Gastroenterology)    Health Habits and Functional and Cognitive Screening and Depression Screening:  Functional & Cognitive Status 2019   Do you have difficulty preparing food and eating? No   Do you have difficulty bathing yourself, getting dressed or grooming yourself? No   Do you have difficulty using the toilet? No   Do you have difficulty moving around from place to place? No   Do you have trouble with steps or getting out of a bed or a chair? No   Dental Exam Up to date   Eye Exam Up to date   Current Exercise Activities Include Walking   Do you need help using the phone?  No   Are you deaf or do you have serious difficulty hearing?  No   Do you need help with transportation? No   Do you need help shopping? No   Do you need help preparing meals?  No   Do you need help with housework?  No   Do you need help with laundry? No   Do you need help taking your medications? No   Do you need help managing money? No   Do you ever drive or ride in a car without wearing a seat belt? No   Have you felt unusual stress, anger or loneliness in the last month? No   Who do you live with? Spouse   If you need help, do you have trouble finding someone available to you? No   Have you been bothered in the last four weeks by sexual problems? No   Do you have difficulty concentrating, remembering or making decisions? No       Compared to  one year ago, the patient feels her physical health is the same and her mental health is the same.    Depression Screen:  PHQ-2/PHQ-9 Depression Screening 2019   Little interest or pleasure in doing things 0   Feeling down, depressed, or hopeless 0   Total Score 0       GUSADI Fall Risk Assessment was completed, and patient is at LOW risk for falls.Assessment completed on:2019    ATTENTION  What is the year: correct  What is the month of the year: correct  What is the day of the week?: correct  What is the date?: correct  MEMORY  Repeat address three times, only score third attempt: [address] [place], [place]: 7  HOW MANY ANIMALS DID THE PATIENT NAME  Verbal Fluency -- Animal Names (0-25): 22+  CLOCK DRAWING  Clock Drawing: All Correct  MEMORY RECALL  Tell me what you remember about that name and address we were repeating at the beginnin  ACE TOTAL SCORE  Total ACE Score - <25/30 strongly suggests cognitive impairment; <21/30 almost certainly shows dementia: 30    Past Medical/Family/Social History:  The following portions of the patient's history were reviewed and updated as appropriate: allergies, current medications, past family history, past medical history, past social history and past surgical history.    No Known Allergies      Current Outpatient Medications:   •  cholecalciferol (VITAMIN D3) 1000 units tablet, Take 1,000 Units by mouth Daily., Disp: , Rfl:   •  DIAZEPAM PO, Take 1 tablet by mouth At Night As Needed., Disp: , Rfl:   •  lisinopril (PRINIVIL,ZESTRIL) 20 MG tablet, Take 20 mg by mouth Daily., Disp: , Rfl:   •  Multiple Vitamins-Minerals (MULTIVITAMIN WITH MINERALS) tablet tablet, Take 1 tablet by mouth Daily., Disp: , Rfl:   •  Probiotic Product (PROBIOTIC-10) capsule, Take  by mouth Daily., Disp: , Rfl:   •  simvastatin (ZOCOR) 20 MG tablet, TAKE 1 TABLET EVERY EVENING, Disp: 90 tablet, Rfl: 3    Aspirin use counseling: Does not need ASA (and currently is  "not on it)    Current medication list contains no high risk medications.  No harmful drug interactions have been identified.     Family History   Problem Relation Age of Onset   • Breast cancer Maternal Aunt 70   • Pancreatic cancer Other    • COPD Other    • Diabetes Other    • Ovarian cancer Neg Hx    • Uterine cancer Neg Hx    • Endometrial cancer Neg Hx    • Colon cancer Neg Hx        Social History     Tobacco Use   • Smoking status: Former Smoker     Last attempt to quit: 1964     Years since quittin.8   • Smokeless tobacco: Never Used   • Tobacco comment: 1 CIGARETTES     Substance Use Topics   • Alcohol use: Yes     Frequency: 4 or more times a week     Drinks per session: 1 or 2     Binge frequency: Never       Past Surgical History:   Procedure Laterality Date   • APPENDECTOMY     •  SECTION     • COLONOSCOPY  2009   • OVARIAN CYST SURGERY     • TUBAL ABDOMINAL LIGATION Bilateral        Patient Active Problem List   Diagnosis   • Insomnia, unspecified   • Post-menopausal   • Screening mammogram, encounter for   • Osteoporosis   • Hyperlipidemia   • Primary osteoarthritis involving multiple joints   • Early cataract   • Essential hypertension   • Vitamin D deficiency   • Left ear impacted cerumen   • Osteopenia   • Medicare annual wellness visit, subsequent   • Polyp of colon   • Alcohol use   • Kyphosis, acquired   • Histoplasmosis   • Lower abdominal pain   • Irritable bowel syndrome   • Cataract of left eye       Review of Systems    Objective     Vitals:    19 0829   BP: 126/82   BP Location: Left arm   Patient Position: Sitting   Cuff Size: Adult   Pulse: 76   Weight: 59.4 kg (131 lb)   Height: 162.6 cm (64\")   PainSc: 0-No pain       Patient's Body mass index is 22.49 kg/m². BMI is within normal parameters. No follow-up required..      No exam data present    The patient has no evidence of cognitve impairment.   ATTENTION  What is the year: correct  What is the month of the " year: correct  What is the day of the week?: correct  What is the date?: correct  MEMORY  Repeat address three times, only score third attempt: [address] [place], [place]: 7  HOW MANY ANIMALS DID THE PATIENT NAME  Verbal Fluency -- Animal Names (0-25): 22+  CLOCK DRAWING  Clock Drawing: All Correct  MEMORY RECALL  Tell me what you remember about that name and address we were repeating at the beginnin  ACE TOTAL SCORE  Total ACE Score - <25/30 strongly suggests cognitive impairment; <21/30 almost certainly shows dementia: 30    Physical Exam    Recent Lab Results:  Lab Results   Component Value Date    GLU 93 2019     Lab Results   Component Value Date    TRIG 70 2019     (H) 2019    VLDL 14 2019       Assessment/Plan   Age-appropriate Screening Schedule:  Refer to the list below for future screening recommendations based on patient's age, sex and/or medical conditions.      Health Maintenance   Topic Date Due   • ZOSTER VACCINE (2 of 3) 10/10/2016   • INFLUENZA VACCINE  2019   • DXA SCAN  2019   • LIPID PANEL  2020   • MAMMOGRAM  2021   • COLONOSCOPY  2024   • TDAP/TD VACCINES (2 - Td) 2025   • PNEUMOCOCCAL VACCINES (65+ LOW/MEDIUM RISK)  Completed       Medicare Risks and Personalized Health Plan:  Cardiovascular risk      CMS-Preventive Services Quick Reference  Medicare Preventive Services Addressed:  Annual Wellness Visit (AWV)    Advance Care Planning:  Patient has an advance directive - a copy has not been provided. Have asked the patient to send this to us to add to record    Diagnoses and all orders for this visit:    1. Well adult exam (Primary)  Comments:  She is fasting for labs today.   Normal cognitive assessment, ACE score 30/30.  Flu given today.    Other orders  -     Fluad Tri 65yr+        An After Visit Summary and PPPS with all of these plans were given to the patient.      Follow Up:  Return for Next  scheduled follow up, labs today.

## 2019-11-05 LAB
25(OH)D3+25(OH)D2 SERPL-MCNC: 35.5 NG/ML (ref 30–100)
ALBUMIN SERPL-MCNC: 4.8 G/DL (ref 3.5–5.2)
ALBUMIN/CREAT UR: 23.6 MG/G CREAT (ref 0–30)
ALBUMIN/GLOB SERPL: 2.3 G/DL
ALP SERPL-CCNC: 91 U/L (ref 39–117)
ALT SERPL-CCNC: 14 U/L (ref 1–33)
AST SERPL-CCNC: 23 U/L (ref 1–32)
BASOPHILS # BLD AUTO: 0.04 10*3/MM3 (ref 0–0.2)
BASOPHILS NFR BLD AUTO: 0.8 % (ref 0–1.5)
BILIRUB SERPL-MCNC: 0.4 MG/DL (ref 0.2–1.2)
BUN SERPL-MCNC: 10 MG/DL (ref 8–23)
BUN/CREAT SERPL: 13.5 (ref 7–25)
CALCIUM SERPL-MCNC: 9.6 MG/DL (ref 8.6–10.5)
CHLORIDE SERPL-SCNC: 103 MMOL/L (ref 98–107)
CHOLEST SERPL-MCNC: 204 MG/DL (ref 0–200)
CO2 SERPL-SCNC: 27.6 MMOL/L (ref 22–29)
CREAT SERPL-MCNC: 0.74 MG/DL (ref 0.57–1)
CREAT UR-MCNC: 74.6 MG/DL
EOSINOPHIL # BLD AUTO: 0.14 10*3/MM3 (ref 0–0.4)
EOSINOPHIL NFR BLD AUTO: 2.8 % (ref 0.3–6.2)
ERYTHROCYTE [DISTWIDTH] IN BLOOD BY AUTOMATED COUNT: 12.2 % (ref 12.3–15.4)
GLOBULIN SER CALC-MCNC: 2.1 GM/DL
GLUCOSE SERPL-MCNC: 90 MG/DL (ref 65–99)
HCT VFR BLD AUTO: 36.9 % (ref 34–46.6)
HDLC SERPL-MCNC: 98 MG/DL (ref 40–60)
HGB BLD-MCNC: 13.2 G/DL (ref 12–15.9)
IMM GRANULOCYTES # BLD AUTO: 0.01 10*3/MM3 (ref 0–0.05)
IMM GRANULOCYTES NFR BLD AUTO: 0.2 % (ref 0–0.5)
LDLC SERPL CALC-MCNC: 93 MG/DL (ref 0–100)
LYMPHOCYTES # BLD AUTO: 1.86 10*3/MM3 (ref 0.7–3.1)
LYMPHOCYTES NFR BLD AUTO: 37.1 % (ref 19.6–45.3)
MCH RBC QN AUTO: 32.8 PG (ref 26.6–33)
MCHC RBC AUTO-ENTMCNC: 35.8 G/DL (ref 31.5–35.7)
MCV RBC AUTO: 91.8 FL (ref 79–97)
MICROALBUMIN UR-MCNC: 17.6 UG/ML
MONOCYTES # BLD AUTO: 0.5 10*3/MM3 (ref 0.1–0.9)
MONOCYTES NFR BLD AUTO: 10 % (ref 5–12)
NEUTROPHILS # BLD AUTO: 2.46 10*3/MM3 (ref 1.7–7)
NEUTROPHILS NFR BLD AUTO: 49.1 % (ref 42.7–76)
NRBC BLD AUTO-RTO: 0 /100 WBC (ref 0–0.2)
PLATELET # BLD AUTO: 217 10*3/MM3 (ref 140–450)
POTASSIUM SERPL-SCNC: 4.4 MMOL/L (ref 3.5–5.2)
PROT SERPL-MCNC: 6.9 G/DL (ref 6–8.5)
RBC # BLD AUTO: 4.02 10*6/MM3 (ref 3.77–5.28)
SODIUM SERPL-SCNC: 143 MMOL/L (ref 136–145)
TRIGL SERPL-MCNC: 66 MG/DL (ref 0–150)
TSH SERPL DL<=0.005 MIU/L-ACNC: 1.47 UIU/ML (ref 0.27–4.2)
VLDLC SERPL CALC-MCNC: 13.2 MG/DL
WBC # BLD AUTO: 5.01 10*3/MM3 (ref 3.4–10.8)

## 2019-11-25 ENCOUNTER — OFFICE VISIT (OUTPATIENT)
Dept: INTERNAL MEDICINE | Facility: CLINIC | Age: 74
End: 2019-11-25

## 2019-11-25 VITALS
HEIGHT: 64 IN | BODY MASS INDEX: 22.36 KG/M2 | SYSTOLIC BLOOD PRESSURE: 122 MMHG | WEIGHT: 131 LBS | DIASTOLIC BLOOD PRESSURE: 80 MMHG

## 2019-11-25 DIAGNOSIS — I10 ESSENTIAL HYPERTENSION: Primary | ICD-10-CM

## 2019-11-25 DIAGNOSIS — K58.9 IRRITABLE BOWEL SYNDROME, UNSPECIFIED TYPE: ICD-10-CM

## 2019-11-25 DIAGNOSIS — E78.2 MIXED HYPERLIPIDEMIA: ICD-10-CM

## 2019-11-25 DIAGNOSIS — E55.9 VITAMIN D DEFICIENCY: ICD-10-CM

## 2019-11-25 DIAGNOSIS — G47.00 INSOMNIA, UNSPECIFIED TYPE: ICD-10-CM

## 2019-11-25 PROCEDURE — 99214 OFFICE O/P EST MOD 30 MIN: CPT | Performed by: INTERNAL MEDICINE

## 2019-11-25 PROCEDURE — 93000 ELECTROCARDIOGRAM COMPLETE: CPT | Performed by: INTERNAL MEDICINE

## 2019-11-25 NOTE — PROGRESS NOTES
[facility]     Lucia Mc  1945   [id number]      Patient Care Team:  Wei Skelton MD as PCP - General  Wei Skelton MD as PCP - Family Medicine  Reginald Dove MD as Consulting Physician (Ophthalmology)  Sammy Delvalle MD as Consulting Physician (Gastroenterology)    Chief Complaint::   Chief Complaint   Patient presents with   • Hyperlipidemia     follow up. Patient had preventative visit on 11/4/19 with Bri Bo PA-C   • Hypertension     follow up   • Irritable Bowel Syndrome     symptoms continue.  Patient has seen GYN, colonoscopy, and CT scan in follow up            HPI  Patient is a 74-year-old female with a history of mixed hyperlipidemia on simvastatin and stable with a history of essential hypertension on lisinopril and stable with a history of irritable bowel and some bloating currently stable and mild sleep insomnia on PRN Valium.  Patient overall feels well has a good appetite sleeps well with the Valium recent colonoscopy of June 2019-normal and CAT scan of the abdomen May 2019-normal with last mammogram January 2019 and in general feels well denying headache or dizzy is a non-smoker occasional caffeine, infrequent alcohol, denies cough or wheeze denies chest pain or pressure denies nausea vomiting or diarrhea has mild extremity arthritic soreness denies edema      Patient Active Problem List   Diagnosis   • Insomnia, unspecified   • Post-menopausal   • Screening mammogram, encounter for   • Osteoporosis   • Hyperlipidemia   • Primary osteoarthritis involving multiple joints   • Early cataract   • Essential hypertension   • Vitamin D deficiency   • Left ear impacted cerumen   • Osteopenia   • Medicare annual wellness visit, subsequent   • Polyp of colon   • Alcohol use   • Kyphosis, acquired   • Histoplasmosis   • Lower abdominal pain   • Irritable bowel syndrome   • Cataract of left eye        Past Medical History:   Diagnosis Date   • Arthritis    •  "Hyperlipidemia    • Hypertension    • Ovarian cyst        Past Surgical History:   Procedure Laterality Date   • APPENDECTOMY     •  SECTION     • COLONOSCOPY  2009   • OVARIAN CYST SURGERY     • TUBAL ABDOMINAL LIGATION Bilateral        Family History   Problem Relation Age of Onset   • Breast cancer Maternal Aunt 70   • Pancreatic cancer Other    • COPD Other    • Diabetes Other    • Ovarian cancer Neg Hx    • Uterine cancer Neg Hx    • Endometrial cancer Neg Hx    • Colon cancer Neg Hx        Social History     Socioeconomic History   • Marital status:      Spouse name: Not on file   • Number of children: Not on file   • Years of education: Not on file   • Highest education level: Not on file   Tobacco Use   • Smoking status: Former Smoker     Last attempt to quit: 1964     Years since quittin.9   • Smokeless tobacco: Never Used   • Tobacco comment: 1 CIGARETTES     Substance and Sexual Activity   • Alcohol use: Yes     Frequency: 4 or more times a week     Drinks per session: 1 or 2     Binge frequency: Never   • Drug use: No   • Sexual activity: No     Birth control/protection: Other       No Known Allergies    Review of Systems     HEENT: Denies headache or dizzy is remote bilateral cataracts  NECK: Denies dysphasia or pain  CHEST: Is a non-smoker denies cough or wheeze  CARDIAC: Denies chest pain pressure palpitations history of hypertension well-controlled  ABD: Denies nausea vomiting is bloating and fullness  : Denies dysuria frequency  NEURO: Denies syncope concussion  PSYCH: Denies anxiety depression  EXTREM: Minor arthritic soreness no edema    Vital Signs  Vitals:    19 1546   BP: 122/80   BP Location: Left arm   Patient Position: Sitting   Cuff Size: Adult   Weight: 59.4 kg (131 lb)   Height: 162.6 cm (64\")   PainSc: 0-No pain     Body mass index is 22.49 kg/m².    Current Outpatient Medications:   •  cholecalciferol (VITAMIN D3) 1000 units tablet, Take 1,000 " Units by mouth Daily., Disp: , Rfl:   •  DIAZEPAM PO, Take 1 tablet by mouth At Night As Needed., Disp: , Rfl:   •  lisinopril (PRINIVIL,ZESTRIL) 20 MG tablet, Take 20 mg by mouth Daily., Disp: , Rfl:   •  Multiple Vitamins-Minerals (MULTIVITAMIN WITH MINERALS) tablet tablet, Take 1 tablet by mouth Daily., Disp: , Rfl:   •  Probiotic Product (PROBIOTIC-10) capsule, Take  by mouth Daily., Disp: , Rfl:   •  simvastatin (ZOCOR) 20 MG tablet, TAKE 1 TABLET EVERY EVENING, Disp: 90 tablet, Rfl: 3    Physical Exam     ACE III MINI         HEENT: No facial asymmetry pharynx is clear  NECK: No masses bruit or thyromegaly  CHEST: Clear to P&A without rales wheezes or rhonchi  CARDIAC: Regular sinus rhythm without gallop rub click or murmur blood pressure stable  ABD: Liver and spleen are normal positive bowel sounds no bruit  :   NEURO: CNS is intact no neuropathy  PSYCH: No anxiety depression  EXTREM: Normal minimal arthritic changes  Skin: Clear     Results Review:    Recent Results (from the past 672 hour(s))   Vitamin D 25 Hydroxy    Collection Time: 11/04/19  9:13 AM   Result Value Ref Range    25 Hydroxy, Vitamin D 35.5 30.0 - 100.0 ng/ml   Microalbumin / Creatinine Urine Ratio -    Collection Time: 11/04/19  9:13 AM   Result Value Ref Range    Creatinine, Urine 74.6 Not Estab. mg/dL    Microalbumin, Urine 17.6 Not Estab. ug/mL    Microalbumin/Creatinine Ratio 23.6 0.0 - 30.0 mg/g creat   TSH    Collection Time: 11/04/19  9:13 AM   Result Value Ref Range    TSH 1.470 0.270 - 4.200 uIU/mL   Lipid Panel    Collection Time: 11/04/19  9:13 AM   Result Value Ref Range    Total Cholesterol 204 (H) 0 - 200 mg/dL    Triglycerides 66 0 - 150 mg/dL    HDL Cholesterol 98 (H) 40 - 60 mg/dL    VLDL Cholesterol 13.2 mg/dL    LDL Cholesterol  93 0 - 100 mg/dL   Comprehensive Metabolic Panel    Collection Time: 11/04/19  9:13 AM   Result Value Ref Range    Glucose 90 65 - 99 mg/dL    BUN 10 8 - 23 mg/dL    Creatinine 0.74 0.57 - 1.00  mg/dL    eGFR Non African Am 77 >60 mL/min/1.73    eGFR African Am 93 >60 mL/min/1.73    BUN/Creatinine Ratio 13.5 7.0 - 25.0    Sodium 143 136 - 145 mmol/L    Potassium 4.4 3.5 - 5.2 mmol/L    Chloride 103 98 - 107 mmol/L    Total CO2 27.6 22.0 - 29.0 mmol/L    Calcium 9.6 8.6 - 10.5 mg/dL    Total Protein 6.9 6.0 - 8.5 g/dL    Albumin 4.80 3.50 - 5.20 g/dL    Globulin 2.1 gm/dL    A/G Ratio 2.3 g/dL    Total Bilirubin 0.4 0.2 - 1.2 mg/dL    Alkaline Phosphatase 91 39 - 117 U/L    AST (SGOT) 23 1 - 32 U/L    ALT (SGPT) 14 1 - 33 U/L   CBC & Differential    Collection Time: 11/04/19  9:13 AM   Result Value Ref Range    WBC 5.01 3.40 - 10.80 10*3/mm3    RBC 4.02 3.77 - 5.28 10*6/mm3    Hemoglobin 13.2 12.0 - 15.9 g/dL    Hematocrit 36.9 34.0 - 46.6 %    MCV 91.8 79.0 - 97.0 fL    MCH 32.8 26.6 - 33.0 pg    MCHC 35.8 (H) 31.5 - 35.7 g/dL    RDW 12.2 (L) 12.3 - 15.4 %    Platelets 217 140 - 450 10*3/mm3    Neutrophil Rel % 49.1 42.7 - 76.0 %    Lymphocyte Rel % 37.1 19.6 - 45.3 %    Monocyte Rel % 10.0 5.0 - 12.0 %    Eosinophil Rel % 2.8 0.3 - 6.2 %    Basophil Rel % 0.8 0.0 - 1.5 %    Neutrophils Absolute 2.46 1.70 - 7.00 10*3/mm3    Lymphocytes Absolute 1.86 0.70 - 3.10 10*3/mm3    Monocytes Absolute 0.50 0.10 - 0.90 10*3/mm3    Eosinophils Absolute 0.14 0.00 - 0.40 10*3/mm3    Basophils Absolute 0.04 0.00 - 0.20 10*3/mm3    Immature Granulocyte Rel % 0.2 0.0 - 0.5 %    Immature Grans Absolute 0.01 0.00 - 0.05 10*3/mm3    nRBC 0.0 0.0 - 0.2 /100 WBC       ECG 12 Lead  Date/Time: 11/25/2019 9:58 PM  Performed by: Wei Skelton MD  Authorized by: Wei Skelton MD   Comparison: not compared with previous ECG   Rhythm: sinus rhythm and sinus arrhythmia  Rate: normal  Conduction: conduction normal  ST Segments: ST segments normal  T Waves: T waves normal  QRS axis: normal    Clinical impression: normal ECG  Comments: EKG-regular sinus rhythm with sinus arrhythmia no ischemia.          Patient Wellness Counseling:    Plan of care reviewed with patient at the conclusion of today's visit. Education was provided in regards to diagnosis, diet and exercise, cervical cancer screening, self breast exams, breast cancer screening, and the importance of yearly mammograms.   Nutrition, family planning/contraception, physical activity, healthy weight,ways to reduce stress, adequate sleep, injury prevention, misuse of tobacco, alcohol and drugs, sexual behavior and STD's, dental health, mental health, and immunizations.    Management and any prescribed or recommended OTC medications.  Patient verbalizes understanding of and agreement with management plan.    Medication Review: Medications reviewed and noted    Patient wellness counseling  Exercise: Encouraged walking exercise  Diet: Encouraged healthy cardiac diet  Smoking: Non-smoker  Alcohol: Infrequent alcohol  Screening: Labs discussed copy given    Assessment/Plan:    Problem List Items Addressed This Visit        Cardiovascular and Mediastinum    Hyperlipidemia    Overview     History of mixed hyperlipidemia on simvastatin 20 mg daily no history of myalgia arthralgia          Current Assessment & Plan     Patient on simvastatin 20 mg daily denies myalgia or arthralgia recent lab work revealed cholesterol of 204 LDL of 93 and HDL of 98-no change in therapy         Relevant Medications    simvastatin (ZOCOR) 20 MG tablet    Essential hypertension - Primary    Overview     History of essential hypertension with current blood pressure 122/80 on lisinopril 20 mg daily denies headache or cough blood pressure stable EKG is normal          Current Assessment & Plan     Pressure stable at 122/80 on lisinopril 20 mg continue therapy         Relevant Medications    lisinopril (PRINIVIL,ZESTRIL) 20 MG tablet    Other Relevant Orders    ECG 12 Lead       Digestive    Vitamin D deficiency    Overview     History of vitamin D deficiency on vitamin D3 1000 units daily and multiple vitamins with  current level normal at 35.5 suggest continue therapy.         Irritable bowel syndrome    Overview     Patient had a recent CT scan of abdomen pelvis, colonoscopy, EGD, and GYN referral no significant pathology was found patient is improved she is on probiotic she is on fiber she does feel better.         Current Assessment & Plan     History of irritable bowel with colonoscopy June 2019 CT scan of May 2019, with dietary change, and probiotic patient has minor discomfort and bloating with no associated cramping nausea vomiting or diarrhea.            Other    Insomnia, unspecified    Current Assessment & Plan     Patient has mild insomnia Valium 5 at  bedtime is beneficial suggest no change in therapy.                Patient Instructions   Lab discussed copy given  EKG discussed-normal  Encouraged walking exercise  Encouraged healthy cardiac diet  Continue all current medications  Mammogram for January 2020  Visit in 6 months or as needed       Plan of care reviewed with patient at the conclusion of today's visit. Education was provided regarding diagnosis, management, and any prescribed or recommended OTC medications.Patient verbalizes understanding of and agreement with management plan.         Wei Skelton MD      Note: Part of this note may be an electronic transcription/translation of spoken language to printed text using the Dragon Dictation system.

## 2019-11-26 NOTE — ASSESSMENT & PLAN NOTE
History of irritable bowel with colonoscopy June 2019 CT scan of May 2019, with dietary change, and probiotic patient has minor discomfort and bloating with no associated cramping nausea vomiting or diarrhea.

## 2019-11-26 NOTE — ASSESSMENT & PLAN NOTE
Patient on simvastatin 20 mg daily denies myalgia or arthralgia recent lab work revealed cholesterol of 204 LDL of 93 and HDL of 98-no change in therapy

## 2019-11-26 NOTE — PATIENT INSTRUCTIONS
Lab discussed copy given  EKG discussed-normal  Encouraged walking exercise  Encouraged healthy cardiac diet  Continue all current medications  Mammogram for January 2020  Visit in 6 months or as needed

## 2019-12-17 ENCOUNTER — TRANSCRIBE ORDERS (OUTPATIENT)
Dept: ADMINISTRATIVE | Facility: HOSPITAL | Age: 74
End: 2019-12-17

## 2019-12-17 DIAGNOSIS — Z12.31 VISIT FOR SCREENING MAMMOGRAM: Primary | ICD-10-CM

## 2020-01-30 RX ORDER — LISINOPRIL 20 MG/1
TABLET ORAL
Qty: 90 TABLET | Refills: 1 | Status: SHIPPED | OUTPATIENT
Start: 2020-01-30 | End: 2020-06-25

## 2020-03-10 ENCOUNTER — HOSPITAL ENCOUNTER (OUTPATIENT)
Dept: MAMMOGRAPHY | Facility: HOSPITAL | Age: 75
Discharge: HOME | End: 2020-03-10
Admitting: INTERNAL MEDICINE

## 2020-03-10 DIAGNOSIS — Z12.31 VISIT FOR SCREENING MAMMOGRAM: ICD-10-CM

## 2020-03-10 PROCEDURE — 77067 SCR MAMMO BI INCL CAD: CPT

## 2020-03-10 PROCEDURE — 77063 BREAST TOMOSYNTHESIS BI: CPT | Performed by: RADIOLOGY

## 2020-03-10 PROCEDURE — 77067 SCR MAMMO BI INCL CAD: CPT | Performed by: RADIOLOGY

## 2020-03-10 PROCEDURE — 77063 BREAST TOMOSYNTHESIS BI: CPT

## 2020-05-28 ENCOUNTER — LAB REQUISITION (OUTPATIENT)
Dept: LAB | Facility: HOSPITAL | Age: 75
End: 2020-05-28

## 2020-05-28 ENCOUNTER — LAB (OUTPATIENT)
Dept: LAB | Facility: HOSPITAL | Age: 75
End: 2020-05-28

## 2020-05-28 ENCOUNTER — OFFICE VISIT (OUTPATIENT)
Dept: INTERNAL MEDICINE | Facility: CLINIC | Age: 75
End: 2020-05-28

## 2020-05-28 VITALS
HEART RATE: 64 BPM | HEIGHT: 64 IN | BODY MASS INDEX: 24.01 KG/M2 | SYSTOLIC BLOOD PRESSURE: 152 MMHG | WEIGHT: 140.6 LBS | TEMPERATURE: 98.4 F | DIASTOLIC BLOOD PRESSURE: 84 MMHG

## 2020-05-28 DIAGNOSIS — M15.9 PRIMARY OSTEOARTHRITIS INVOLVING MULTIPLE JOINTS: ICD-10-CM

## 2020-05-28 DIAGNOSIS — I10 ESSENTIAL HYPERTENSION: ICD-10-CM

## 2020-05-28 DIAGNOSIS — E78.2 MIXED HYPERLIPIDEMIA: ICD-10-CM

## 2020-05-28 DIAGNOSIS — I10 ESSENTIAL HYPERTENSION: Primary | ICD-10-CM

## 2020-05-28 DIAGNOSIS — Z00.00 ROUTINE GENERAL MEDICAL EXAMINATION AT A HEALTH CARE FACILITY: ICD-10-CM

## 2020-05-28 LAB
ALBUMIN SERPL-MCNC: 4.9 G/DL (ref 3.5–5.2)
ALBUMIN/GLOB SERPL: 2.2 G/DL
ALP SERPL-CCNC: 94 U/L (ref 39–117)
ALT SERPL-CCNC: 17 U/L (ref 1–33)
AST SERPL-CCNC: 24 U/L (ref 1–32)
BILIRUB SERPL-MCNC: 0.4 MG/DL (ref 0.2–1.2)
BUN SERPL-MCNC: 12 MG/DL (ref 8–23)
BUN/CREAT SERPL: 16.4 (ref 7–25)
CALCIUM SERPL-MCNC: 9.7 MG/DL (ref 8.6–10.5)
CHLORIDE SERPL-SCNC: 103 MMOL/L (ref 98–107)
CHOLEST SERPL-MCNC: 195 MG/DL (ref 0–200)
CO2 SERPL-SCNC: 31.2 MMOL/L (ref 22–29)
CREAT SERPL-MCNC: 0.73 MG/DL (ref 0.57–1)
GLOBULIN SER CALC-MCNC: 2.2 GM/DL
GLUCOSE SERPL-MCNC: 93 MG/DL (ref 65–99)
HDLC SERPL-MCNC: 84 MG/DL (ref 40–60)
LDLC SERPL CALC-MCNC: 94 MG/DL (ref 0–100)
POTASSIUM SERPL-SCNC: 4.4 MMOL/L (ref 3.5–5.2)
PROT SERPL-MCNC: 7.1 G/DL (ref 6–8.5)
SODIUM SERPL-SCNC: 144 MMOL/L (ref 136–145)
TRIGL SERPL-MCNC: 84 MG/DL (ref 0–150)
VLDLC SERPL CALC-MCNC: 16.8 MG/DL (ref 5–40)

## 2020-05-28 PROCEDURE — 99214 OFFICE O/P EST MOD 30 MIN: CPT | Performed by: INTERNAL MEDICINE

## 2020-05-28 RX ORDER — SIMVASTATIN 20 MG
20 TABLET ORAL EVERY EVENING
Qty: 90 TABLET | Refills: 3 | Status: SHIPPED | OUTPATIENT
Start: 2020-05-28 | End: 2021-07-02 | Stop reason: SDUPTHER

## 2020-05-28 NOTE — PROGRESS NOTES
[facility]     Lucia Mc  1945   [id number]      Patient Care Team:  Wei Skelton MD as PCP - General  Wei Skelton MD as PCP - Family Medicine  Reginald Dove MD as Consulting Physician (Ophthalmology)  Sammy Delvalle MD as Consulting Physician (Gastroenterology)    Chief Complaint::   Chief Complaint   Patient presents with   • Hypertension     6 mo f/u     fasting   • Hyperlipidemia            HPI  75-year-old female with a history of essential hypertension and mixed hyperlipidemia with a history of mild region of arthritis disease mild stress anxiety and chronic allergy overall feeling well said no recent changes in diet medication or activity denies headache or dizzy dysphasia cough wheeze pulmonary infections chest pain pressure nausea vomiting extremities are uncomfortable with mild arthritic changes.      Patient Active Problem List   Diagnosis   • Insomnia, unspecified   • Post-menopausal   • Screening mammogram, encounter for   • Osteoporosis   • Hyperlipidemia   • Primary osteoarthritis involving multiple joints   • Early cataract   • Essential hypertension   • Vitamin D deficiency   • Left ear impacted cerumen   • Osteopenia   • Medicare annual wellness visit, subsequent   • Polyp of colon   • Alcohol use   • Kyphosis, acquired   • Histoplasmosis   • Lower abdominal pain   • Irritable bowel syndrome   • Cataract of left eye        Past Medical History:   Diagnosis Date   • Arthritis    • Hyperlipidemia    • Hypertension    • Ovarian cyst        Past Surgical History:   Procedure Laterality Date   • APPENDECTOMY     •  SECTION     • COLONOSCOPY  2009   • OVARIAN CYST SURGERY     • TUBAL ABDOMINAL LIGATION Bilateral        Family History   Problem Relation Age of Onset   • Breast cancer Maternal Aunt 70   • Pancreatic cancer Other    • COPD Other    • Diabetes Other    • Ovarian cancer Neg Hx    • Uterine cancer Neg Hx    • Endometrial cancer Neg Hx    •  "Colon cancer Neg Hx        Social History     Socioeconomic History   • Marital status:      Spouse name: Not on file   • Number of children: Not on file   • Years of education: Not on file   • Highest education level: Not on file   Tobacco Use   • Smoking status: Former Smoker     Last attempt to quit: 1964     Years since quittin.4   • Smokeless tobacco: Never Used   • Tobacco comment: 1 CIGARETTES     Substance and Sexual Activity   • Alcohol use: Yes     Frequency: 4 or more times a week     Drinks per session: 1 or 2     Binge frequency: Never   • Drug use: No   • Sexual activity: Never     Birth control/protection: Other       No Known Allergies    Review of Systems     HEENT: Denies headache or dizzy has chronic mild mild allergies  NECK: Denies pain stiffness dysphasia reflux  CHEST: Non-smoker denies cough wheeze or shortness of breath or recent pulmonary infections  CARDIAC: Denies chest pain pressure denies palpitations  ABD: Denies nausea vomiting abdominal pain  : Denies dysuria frequency  NEURO: Denies syncope concussion or neuropathy  PSYCH: No anxiety depression  EXTREM: Mild arthritic changes no edema    Vital Signs  Vitals:    20 0833   BP: 152/84   BP Location: Left arm   Patient Position: Sitting   Cuff Size: Adult   Pulse: 64   Temp: 98.4 °F (36.9 °C)   TempSrc: Temporal   Weight: 63.8 kg (140 lb 9.6 oz)   Height: 162.6 cm (64.02\")   PainSc: 0-No pain     Body mass index is 24.12 kg/m².  Patient's Body mass index is 24.12 kg/m². BMI is within normal parameters. No follow-up required..     Advance Care Planning   ACP discussion was held with the patient during this visit. Patient has an advance directive in EMR which is still valid.        Current Outpatient Medications:   •  cholecalciferol (VITAMIN D3) 1000 units tablet, Take 1,000 Units by mouth Daily., Disp: , Rfl:   •  DIAZEPAM PO, Take 1 tablet by mouth At Night As Needed., Disp: , Rfl:   •  lisinopril " (PRINIVIL,ZESTRIL) 20 MG tablet, TAKE 1 TABLET EVERY DAY , Disp: 90 tablet, Rfl: 1  •  Multiple Vitamins-Minerals (MULTIVITAMIN WITH MINERALS) tablet tablet, Take 1 tablet by mouth Daily., Disp: , Rfl:   •  Probiotic Product (PROBIOTIC-10) capsule, Take  by mouth Daily., Disp: , Rfl:   •  simvastatin (ZOCOR) 20 MG tablet, Take 1 tablet by mouth Every Evening., Disp: 90 tablet, Rfl: 3    Physical Exam     ACE III MINI         HEENT: No facial symmetry pharynx is clear  NECK: No masses bruit thyromegaly or neck vein distention  CHEST: Good breath sounds without rales wheezes or rhonchi  CARDIAC: Regular rhythm no gallop rub or click blood pressure stable  ABD: Liver and spleen are normal positive bowel sounds no bruit  : Deferred  NEURO: Intact  PSYCH: Normal  EXTREM: Mild arthritic change no edema  Skin: Clear     Results Review:    Recent Results (from the past 672 hour(s))   Lipid Panel    Collection Time: 05/28/20  9:48 AM   Result Value Ref Range    Total Cholesterol 195 0 - 200 mg/dL    Triglycerides 84 0 - 150 mg/dL    HDL Cholesterol 84 (H) 40 - 60 mg/dL    VLDL Cholesterol 16.8 5 - 40 mg/dL    LDL Cholesterol  94 0 - 100 mg/dL   Comprehensive Metabolic Panel    Collection Time: 05/28/20  9:48 AM   Result Value Ref Range    Glucose 93 65 - 99 mg/dL    BUN 12 8 - 23 mg/dL    Creatinine 0.73 0.57 - 1.00 mg/dL    eGFR Non African Am 78 >60 mL/min/1.73    eGFR African Am 94 >60 mL/min/1.73    BUN/Creatinine Ratio 16.4 7.0 - 25.0    Sodium 144 136 - 145 mmol/L    Potassium 4.4 3.5 - 5.2 mmol/L    Chloride 103 98 - 107 mmol/L    Total CO2 31.2 (H) 22.0 - 29.0 mmol/L    Calcium 9.7 8.6 - 10.5 mg/dL    Total Protein 7.1 6.0 - 8.5 g/dL    Albumin 4.90 3.50 - 5.20 g/dL    Globulin 2.2 gm/dL    A/G Ratio 2.2 g/dL    Total Bilirubin 0.4 0.2 - 1.2 mg/dL    Alkaline Phosphatase 94 39 - 117 U/L    AST (SGOT) 24 1 - 32 U/L    ALT (SGPT) 17 1 - 33 U/L     Procedures    Medication Review: Medications reviewed and  noted    Patient wellness counseling  Exercise: Encouraged walking exercise  Diet: Encouraged healthy cardiac diet  Smoking: Non-smoker  Alcohol: Occasional alcohol  Screening: Fasting CMP and lipid are pending    Assessment/Plan:    Problem List Items Addressed This Visit        Cardiovascular and Mediastinum    Hyperlipidemia    Overview     History of mixed hyperlipidemia on simvastatin 20 mg daily no history of myalgia arthralgia          Current Assessment & Plan       Mixed hyperlipidemia treated with simvastatin 20 mg fasting CMP and lipid are pending no change therapy         Relevant Medications    simvastatin (ZOCOR) 20 MG tablet    Other Relevant Orders    Lipid Panel (Completed)    Essential hypertension - Primary    Overview     History of essential hypertension with current blood pressure 122/80 on lisinopril 20 mg daily denies headache or cough blood pressure stable EKG is normal          Current Assessment & Plan     Essential hypertension with initial blood pressure 152/84 repeated at 138/82 on lisinopril 20 mg daily continue therapy         Relevant Medications    lisinopril (PRINIVIL,ZESTRIL) 20 MG tablet    Other Relevant Orders    Comprehensive Metabolic Panel (Completed)    Lipid Panel (Completed)       Musculoskeletal and Integument    Primary osteoarthritis involving multiple joints    Overview     Multi-joint knowledgeable arthritis disease with back pain and and bilateral knee discomfort overall doing well PRN NSAID therapy or Tylenol are beneficial         Current Assessment & Plan     History of degenerative arthritis disease mild affecting hands low back hips and knees PRN NSAID therapy and Tylenol are beneficial                Patient Instructions   Fasting lipid and CMP are pending  Renew all therapy and renew simvastatin  Encouraged walking exercise and healthy cardiac diet  Return visit in 6 months for physical and annual wellness visit       Plan of care reviewed with patient at the  conclusion of today's visit. Education was provided regarding diagnosis, management, and any prescribed or recommended OTC medications.Patient verbalizes understanding of and agreement with management plan.         Wei Skelton MD      Note: Part of this note may be an electronic transcription/translation of spoken language to printed text using the Dragon Dictation system.

## 2020-05-28 NOTE — ASSESSMENT & PLAN NOTE
Mixed hyperlipidemia treated with simvastatin 20 mg fasting CMP and lipid are pending no change therapy

## 2020-05-28 NOTE — ASSESSMENT & PLAN NOTE
Essential hypertension with initial blood pressure 152/84 repeated at 138/82 on lisinopril 20 mg daily continue therapy

## 2020-05-28 NOTE — ASSESSMENT & PLAN NOTE
History of degenerative arthritis disease mild affecting hands low back hips and knees PRN NSAID therapy and Tylenol are beneficial

## 2020-05-28 NOTE — PATIENT INSTRUCTIONS
Fasting lipid and CMP are pending  Renew all therapy and renew simvastatin  Encouraged walking exercise and healthy cardiac diet  Return visit in 6 months for physical and annual wellness visit

## 2020-06-25 RX ORDER — LISINOPRIL 20 MG/1
TABLET ORAL
Qty: 90 TABLET | Refills: 1 | Status: SHIPPED | OUTPATIENT
Start: 2020-06-25 | End: 2020-11-23

## 2020-11-11 ENCOUNTER — OFFICE VISIT (OUTPATIENT)
Dept: INTERNAL MEDICINE | Facility: CLINIC | Age: 75
End: 2020-11-11

## 2020-11-11 ENCOUNTER — LAB REQUISITION (OUTPATIENT)
Dept: LAB | Facility: HOSPITAL | Age: 75
End: 2020-11-11

## 2020-11-11 VITALS
HEART RATE: 88 BPM | HEIGHT: 64 IN | TEMPERATURE: 97.3 F | DIASTOLIC BLOOD PRESSURE: 84 MMHG | SYSTOLIC BLOOD PRESSURE: 148 MMHG | BODY MASS INDEX: 23.15 KG/M2 | WEIGHT: 135.6 LBS

## 2020-11-11 DIAGNOSIS — Z78.0 POSTMENOPAUSAL: ICD-10-CM

## 2020-11-11 DIAGNOSIS — Z00.00 MEDICARE ANNUAL WELLNESS VISIT, SUBSEQUENT: Primary | ICD-10-CM

## 2020-11-11 DIAGNOSIS — Z00.00 ROUTINE GENERAL MEDICAL EXAMINATION AT A HEALTH CARE FACILITY: ICD-10-CM

## 2020-11-11 DIAGNOSIS — Z79.899 LONG-TERM USE OF HIGH-RISK MEDICATION: ICD-10-CM

## 2020-11-11 DIAGNOSIS — E78.2 MIXED HYPERLIPIDEMIA: ICD-10-CM

## 2020-11-11 DIAGNOSIS — E55.9 VITAMIN D DEFICIENCY: ICD-10-CM

## 2020-11-11 DIAGNOSIS — I10 ESSENTIAL HYPERTENSION: ICD-10-CM

## 2020-11-11 PROCEDURE — 96160 PT-FOCUSED HLTH RISK ASSMT: CPT | Performed by: NURSE PRACTITIONER

## 2020-11-11 PROCEDURE — G0439 PPPS, SUBSEQ VISIT: HCPCS | Performed by: NURSE PRACTITIONER

## 2020-11-11 PROCEDURE — 36415 COLL VENOUS BLD VENIPUNCTURE: CPT

## 2020-11-11 RX ORDER — WHEAT DEXTRIN 3 G/3.8 G
1 POWDER (GRAM) ORAL DAILY
COMMUNITY

## 2020-11-11 NOTE — PROGRESS NOTES
The ABCs of the Annual Wellness Visit  Subsequent Medicare Wellness Visit    Chief Complaint   Patient presents with   • Annual Exam     Subsequent Medicare Physical       Subjective   History of Present Illness:  Lucia Mc is a 75 y.o. female who presents for a Subsequent Medicare Wellness Visit.    HEALTH RISK ASSESSMENT    Recent Hospitalizations:  No hospitalization(s) within the last year.    Current Medical Providers:  Patient Care Team:  Wei Skelton MD as PCP - General  Wei Skelton MD as PCP - Family Medicine  Reginald Dove MD as Consulting Physician (Ophthalmology)  Sammy Delvalle MD as Consulting Physician (Gastroenterology)    Smoking Status:  Social History     Tobacco Use   Smoking Status Former Smoker   • Packs/day: 0.25   • Years: 1.00   • Pack years: 0.25   • Types: Cigarettes   • Quit date: 1964   • Years since quittin.9   Smokeless Tobacco Never Used   Tobacco Comment    1 CIGARETTES         Alcohol Consumption:  Social History     Substance and Sexual Activity   Alcohol Use Yes   • Frequency: 4 or more times a week   • Drinks per session: 1 or 2   • Binge frequency: Never       Depression Screen:   PHQ-2/PHQ-9 Depression Screening 2020   Little interest or pleasure in doing things 0   Feeling down, depressed, or hopeless 0   Total Score 0       Fall Risk Screen:  STEADI Fall Risk Assessment was completed, and patient is at LOW risk for falls.Assessment completed on:2020    Health Habits and Functional and Cognitive Screening:  Functional & Cognitive Status 2020   Do you have difficulty preparing food and eating? No   Do you have difficulty bathing yourself, getting dressed or grooming yourself? No   Do you have difficulty using the toilet? No   Do you have difficulty moving around from place to place? No   Do you have trouble with steps or getting out of a bed or a chair? No   Current Diet Well Balanced Diet   Dental Exam Up to date   Eye Exam Up to date    Exercise (times per week) 5 times per week   Current Exercise Activities Include Walking   Do you need help using the phone?  No   Are you deaf or do you have serious difficulty hearing?  No   Do you need help with transportation? No   Do you need help shopping? No   Do you need help preparing meals?  No   Do you need help with housework?  No   Do you need help with laundry? No   Do you need help taking your medications? No   Do you need help managing money? No   Do you ever drive or ride in a car without wearing a seat belt? No   Have you felt unusual stress, anger or loneliness in the last month? No   Who do you live with? Alone   If you need help, do you have trouble finding someone available to you? No   Have you been bothered in the last four weeks by sexual problems? No   Do you have difficulty concentrating, remembering or making decisions? No         Does the patient have evidence of cognitive impairment? No    Asprin use counseling:Does not need ASA (and currently is not on it)    Age-appropriate Screening Schedule:  Refer to the list below for future screening recommendations based on patient's age, sex and/or medical conditions. Orders for these recommended tests are listed in the plan section. The patient has been provided with a written plan.    Health Maintenance   Topic Date Due   • ZOSTER VACCINE (2 of 3) 10/10/2016   • DXA SCAN  12/20/2019   • INFLUENZA VACCINE  08/01/2020   • LIPID PANEL  05/28/2021   • MAMMOGRAM  03/10/2022   • COLONOSCOPY  06/17/2024   • TDAP/TD VACCINES (2 - Td) 04/20/2025          The following portions of the patient's history were reviewed and updated as appropriate: allergies, current medications, past family history, past medical history, past social history, past surgical history and problem list.    Outpatient Medications Prior to Visit   Medication Sig Dispense Refill   • cholecalciferol (VITAMIN D3) 1000 units tablet Take 1,000 Units by mouth Daily.     • DIAZEPAM PO  Take 1 tablet by mouth At Night As Needed.     • lisinopril (PRINIVIL,ZESTRIL) 20 MG tablet TAKE 1 TABLET EVERY DAY  90 tablet 1   • Multiple Vitamins-Minerals (MULTIVITAMIN WITH MINERALS) tablet tablet Take 1 tablet by mouth Daily.     • Probiotic Product (PROBIOTIC-10) capsule Take  by mouth Daily.     • simvastatin (ZOCOR) 20 MG tablet Take 1 tablet by mouth Every Evening. 90 tablet 3   • Wheat Dextrin (Benefiber) powder Take 1 each by mouth Daily.       No facility-administered medications prior to visit.        Patient Active Problem List   Diagnosis   • Insomnia, unspecified   • Post-menopausal   • Screening mammogram, encounter for   • Osteoporosis   • Hyperlipidemia   • Primary osteoarthritis involving multiple joints   • Early cataract   • Essential hypertension   • Vitamin D deficiency   • Left ear impacted cerumen   • Osteopenia   • Medicare annual wellness visit, subsequent   • Polyp of colon   • Alcohol use   • Kyphosis, acquired   • Histoplasmosis   • Lower abdominal pain   • Irritable bowel syndrome   • Cataract of left eye       Advanced Care Planning:  ACP discussion was held with the patient during this visit. Patient has an advance directive (not in EMR), copy requested.    Review of Systems   Constitutional: Negative for chills, fatigue and fever.   HENT: Negative for congestion, ear pain and sinus pressure.    Respiratory: Negative for cough, chest tightness, shortness of breath and wheezing.    Cardiovascular: Negative for chest pain and palpitations.   Gastrointestinal: Negative for abdominal pain, blood in stool and constipation.   Skin: Negative for color change.   Allergic/Immunologic: Negative for environmental allergies.   Neurological: Negative for dizziness, speech difficulty and headaches.   Psychiatric/Behavioral: Negative for confusion. The patient is not nervous/anxious.        Compared to one year ago, the patient feels her physical health is the same.  Compared to one year ago,  "the patient feels her mental health is the same.    Reviewed chart for potential of high risk medication in the elderly: yes  Reviewed chart for potential of harmful drug interactions in the elderly:yes    Objective         Vitals:    11/11/20 0813   BP: 148/84   BP Location: Left arm   Patient Position: Sitting   Cuff Size: Adult   Pulse: 88   Temp: 97.3 °F (36.3 °C)   Weight: 61.5 kg (135 lb 9.6 oz)   Height: 162.6 cm (64.02\")   PainSc: 0-No pain       Body mass index is 23.26 kg/m².  Discussed the patient's BMI with her. The BMI is in the acceptable range.    Physical Exam          Assessment/Plan   Medicare Risks and Personalized Health Plan  CMS Preventative Services Quick Reference  Immunizations Discussed/Encouraged (specific immunizations; Hepatitis A Vaccine/Series and Shingrix )    The above risks/problems have been discussed with the patient.  Pertinent information has been shared with the patient in the After Visit Summary.  Follow up plans and orders are seen below in the Assessment/Plan Section.    Diagnoses and all orders for this visit:    1. Medicare annual wellness visit, subsequent (Primary)    2. Mixed hyperlipidemia  -     Lipid Panel; Future  -     TSH Rfx On Abnormal To Free T4; Future    3. Essential hypertension  -     CBC & Differential; Future  -     Comprehensive Metabolic Panel; Future  -     Microalbumin / Creatinine Urine Ratio - Urine, Clean Catch; Future    4. Vitamin D deficiency  -     Vitamin D 25 Hydroxy; Future    5. Long-term use of high-risk medication  -     Urine Drug Screen - Urine, Clean Catch; Future    6. Postmenopausal  -     DEXA Bone Density Axial; Future      Follow Up:  Return for Next scheduled follow up, Labs this visit.     An After Visit Summary and PPPS were given to the patient.             "

## 2020-11-11 NOTE — PATIENT INSTRUCTIONS
Medicare Wellness  Personal Prevention Plan of Service     Date of Office Visit:  2020  Encounter Provider:  KAITLYN Sibley  Place of Service:  [facility]  Patient Name: Lucia Mc  :  1945    As part of the Medicare Wellness portion of your visit today, we are providing you with this personalized preventive plan of services (PPPS). This plan is based upon recommendations of the [place] Preventive Services Task Force (USPSTF) and the Advisory Committee on Immunization Practices (ACIP).    This lists the preventive care services that should be considered, and provides dates of when you are due. Items listed as completed are up-to-date and do not require any further intervention.    Health Maintenance   Topic Date Due   • ZOSTER VACCINE (2 of 3) 10/10/2016   • HEPATITIS C SCREENING  2018   • DXA SCAN  2019   • INFLUENZA VACCINE  2020   • ANNUAL WELLNESS VISIT  2020   • LIPID PANEL  2021   • MAMMOGRAM  03/10/2022   • COLONOSCOPY  2024   • TDAP/TD VACCINES (2 - Td) 2025   • Pneumococcal Vaccine 65+  Completed       Orders Placed This Encounter   Procedures   • DEXA Bone Density Axial     Standing Status:   Future     Standing Expiration Date:   2021     Order Specific Question:   Reason for Exam:     Answer:   postmenopausal   • Comprehensive Metabolic Panel     Standing Status:   Future     Standing Expiration Date:   2021   • Lipid Panel     Standing Status:   Future     Standing Expiration Date:   2021   • TSH Rfx On Abnormal To Free T4     Standing Status:   Future     Standing Expiration Date:   2021   • Urine Drug Screen - Urine, Clean Catch     Standing Status:   Future     Standing Expiration Date:   2021   • Vitamin D 25 Hydroxy     Standing Status:   Future     Standing Expiration Date:   2021   • Microalbumin / Creatinine Urine Ratio - Urine, Clean Catch      Standing Status:   Future     Standing Expiration Date:   11/11/2021   • CBC & Differential     Standing Status:   Future     Standing Expiration Date:   11/11/2021     Order Specific Question:   Manual Differential     Answer:   No       No follow-ups on file.

## 2020-11-12 LAB
25(OH)D3+25(OH)D2 SERPL-MCNC: 50.6 NG/ML (ref 30–100)
ALBUMIN SERPL-MCNC: 4.6 G/DL (ref 3.5–5.2)
ALBUMIN/CREAT UR: 16 MG/G CREAT (ref 0–29)
ALBUMIN/GLOB SERPL: 2 G/DL
ALP SERPL-CCNC: 105 U/L (ref 39–117)
ALT SERPL-CCNC: 15 U/L (ref 1–33)
AMPHETAMINES UR QL SCN: NEGATIVE NG/ML
AST SERPL-CCNC: 19 U/L (ref 1–32)
BARBITURATES UR QL SCN: NEGATIVE NG/ML
BASOPHILS # BLD AUTO: 0.04 10*3/MM3 (ref 0–0.2)
BASOPHILS NFR BLD AUTO: 0.8 % (ref 0–1.5)
BENZODIAZ UR QL SCN: NEGATIVE NG/ML
BILIRUB SERPL-MCNC: 0.4 MG/DL (ref 0–1.2)
BUN SERPL-MCNC: 15 MG/DL (ref 8–23)
BUN/CREAT SERPL: 19.2 (ref 7–25)
BZE UR QL SCN: NEGATIVE NG/ML
CALCIUM SERPL-MCNC: 9.7 MG/DL (ref 8.6–10.5)
CANNABINOIDS UR QL SCN: NEGATIVE NG/ML
CHLORIDE SERPL-SCNC: 104 MMOL/L (ref 98–107)
CHOLEST SERPL-MCNC: 199 MG/DL (ref 0–200)
CO2 SERPL-SCNC: 29.2 MMOL/L (ref 22–29)
CREAT SERPL-MCNC: 0.78 MG/DL (ref 0.57–1)
CREAT UR-MCNC: 76.8 MG/DL
CREAT UR-MCNC: 80.4 MG/DL (ref 20–300)
EOSINOPHIL # BLD AUTO: 0.13 10*3/MM3 (ref 0–0.4)
EOSINOPHIL NFR BLD AUTO: 2.5 % (ref 0.3–6.2)
ERYTHROCYTE [DISTWIDTH] IN BLOOD BY AUTOMATED COUNT: 12.5 % (ref 12.3–15.4)
GLOBULIN SER CALC-MCNC: 2.3 GM/DL
GLUCOSE SERPL-MCNC: 89 MG/DL (ref 65–99)
HCT VFR BLD AUTO: 37.7 % (ref 34–46.6)
HDLC SERPL-MCNC: 100 MG/DL (ref 40–60)
HGB BLD-MCNC: 13.3 G/DL (ref 12–15.9)
IMM GRANULOCYTES # BLD AUTO: 0 10*3/MM3 (ref 0–0.05)
IMM GRANULOCYTES NFR BLD AUTO: 0 % (ref 0–0.5)
LABORATORY COMMENT REPORT: NORMAL
LDLC SERPL CALC-MCNC: 88 MG/DL (ref 0–100)
LYMPHOCYTES # BLD AUTO: 2.2 10*3/MM3 (ref 0.7–3.1)
LYMPHOCYTES NFR BLD AUTO: 42.5 % (ref 19.6–45.3)
MCH RBC QN AUTO: 32.5 PG (ref 26.6–33)
MCHC RBC AUTO-ENTMCNC: 35.3 G/DL (ref 31.5–35.7)
MCV RBC AUTO: 92.2 FL (ref 79–97)
METHADONE UR QL SCN: NEGATIVE NG/ML
MICROALBUMIN UR-MCNC: 12.1 UG/ML
MONOCYTES # BLD AUTO: 0.54 10*3/MM3 (ref 0.1–0.9)
MONOCYTES NFR BLD AUTO: 10.4 % (ref 5–12)
NEUTROPHILS # BLD AUTO: 2.27 10*3/MM3 (ref 1.7–7)
NEUTROPHILS NFR BLD AUTO: 43.8 % (ref 42.7–76)
NRBC BLD AUTO-RTO: 0 /100 WBC (ref 0–0.2)
OPIATES UR QL SCN: NEGATIVE NG/ML
OXYCODONE+OXYMORPHONE UR QL SCN: NEGATIVE NG/ML
PCP UR QL: NEGATIVE NG/ML
PH UR: 8.1 [PH] (ref 4.5–8.9)
PLATELET # BLD AUTO: 261 10*3/MM3 (ref 140–450)
POTASSIUM SERPL-SCNC: 4.5 MMOL/L (ref 3.5–5.2)
PROPOXYPH UR QL SCN: NEGATIVE NG/ML
PROT SERPL-MCNC: 6.9 G/DL (ref 6–8.5)
RBC # BLD AUTO: 4.09 10*6/MM3 (ref 3.77–5.28)
SODIUM SERPL-SCNC: 143 MMOL/L (ref 136–145)
TRIGL SERPL-MCNC: 62 MG/DL (ref 0–150)
TSH SERPL DL<=0.005 MIU/L-ACNC: 1.11 UIU/ML (ref 0.27–4.2)
VLDLC SERPL CALC-MCNC: 11 MG/DL (ref 5–40)
WBC # BLD AUTO: 5.18 10*3/MM3 (ref 3.4–10.8)

## 2020-11-16 ENCOUNTER — TRANSCRIBE ORDERS (OUTPATIENT)
Dept: ADMINISTRATIVE | Facility: HOSPITAL | Age: 75
End: 2020-11-16

## 2020-11-16 DIAGNOSIS — Z12.31 VISIT FOR SCREENING MAMMOGRAM: Primary | ICD-10-CM

## 2020-11-18 ENCOUNTER — OFFICE VISIT (OUTPATIENT)
Dept: INTERNAL MEDICINE | Facility: CLINIC | Age: 75
End: 2020-11-18

## 2020-11-18 VITALS
HEART RATE: 94 BPM | SYSTOLIC BLOOD PRESSURE: 130 MMHG | WEIGHT: 135 LBS | HEIGHT: 64 IN | TEMPERATURE: 97.3 F | BODY MASS INDEX: 23.05 KG/M2 | DIASTOLIC BLOOD PRESSURE: 82 MMHG

## 2020-11-18 DIAGNOSIS — K58.9 IRRITABLE BOWEL SYNDROME, UNSPECIFIED TYPE: ICD-10-CM

## 2020-11-18 DIAGNOSIS — F51.01 PRIMARY INSOMNIA: Primary | ICD-10-CM

## 2020-11-18 DIAGNOSIS — I10 ESSENTIAL HYPERTENSION: ICD-10-CM

## 2020-11-18 DIAGNOSIS — M54.6 ACUTE MIDLINE THORACIC BACK PAIN: ICD-10-CM

## 2020-11-18 DIAGNOSIS — E78.2 MIXED HYPERLIPIDEMIA: ICD-10-CM

## 2020-11-18 DIAGNOSIS — M15.9 PRIMARY OSTEOARTHRITIS INVOLVING MULTIPLE JOINTS: ICD-10-CM

## 2020-11-18 PROCEDURE — 99214 OFFICE O/P EST MOD 30 MIN: CPT | Performed by: INTERNAL MEDICINE

## 2020-11-18 PROCEDURE — 93000 ELECTROCARDIOGRAM COMPLETE: CPT | Performed by: INTERNAL MEDICINE

## 2020-11-18 RX ORDER — TEMAZEPAM 30 MG/1
30 CAPSULE ORAL NIGHTLY PRN
Start: 2020-11-18

## 2020-11-18 NOTE — PROGRESS NOTES
[facility]     Lucia Mc  1945   [id number]      Patient Care Team:  Wei Skelton MD as PCP - General  Wei Skelton MD as PCP - Family Medicine  Reginald Dove MD as Consulting Physician (Ophthalmology)  Sammy Delvalle MD as Consulting Physician (Gastroenterology)    Chief Complaint::   Chief Complaint   Patient presents with   • Hypertension     follow up.  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  The patient is remote cataract on the left she has mammograms and DEXA scan scheduled for March 2021 her last colonoscopy was 2019 with repeat in 5 years she complains of mild  urgency.  Patient overall feels well has no difficulty with ADL has no evidence of memory or depression changes she remains physically active and taking her regular medications.      Patient Active Problem List   Diagnosis   • Insomnia, unspecified   • Post-menopausal   • Screening mammogram, encounter for   • Osteoporosis   • Hyperlipidemia   • Primary osteoarthritis involving multiple joints   • Early cataract   • Essential hypertension   • Vitamin D deficiency   • Left ear impacted cerumen   • Osteopenia   • Medicare annual wellness visit, subsequent   • Polyp of  colon   • Alcohol use   • Kyphosis, acquired   • Histoplasmosis   • Lower abdominal pain   • Irritable bowel syndrome   • Cataract of left eye   • Acute midline thoracic back pain        Past Medical History:   Diagnosis Date   • Arthritis    • Hyperlipidemia    • Hypertension    • Ovarian cyst        Past Surgical History:   Procedure Laterality Date   • APPENDECTOMY     •  SECTION     • COLONOSCOPY  2009   • OVARIAN CYST SURGERY     • TUBAL ABDOMINAL LIGATION Bilateral        Family History   Problem Relation Age of Onset   • Breast cancer Maternal Aunt 70   • Pancreatic cancer Other    • COPD Other    • Diabetes Other    • Ovarian cancer Neg Hx    • Uterine cancer Neg Hx    • Endometrial cancer Neg Hx    • Colon cancer Neg Hx        Social History     Socioeconomic History   • Marital status:      Spouse name: Not on file   • Number of children: Not on file   • Years of education: Not on file   • Highest education level: Not on file   Tobacco Use   • Smoking status: Former Smoker     Packs/day: 0.25     Years: 1.00     Pack years: 0.25     Types: Cigarettes     Quit date: 1964     Years since quittin.9   • Smokeless tobacco: Never Used   • Tobacco comment: 1 CIGARETTES     Substance and Sexual Activity   • Alcohol use: Yes     Frequency: 4 or more times a week     Drinks per session: 1 or 2     Binge frequency: Never   • Drug use: No   • Sexual activity: Never     Birth control/protection: Other       No Known Allergies    Review of Systems   Constitutional: Negative for chills, fatigue and fever.   HENT: Negative for congestion, ear pain and sinus pressure.    Respiratory: Negative for cough, chest tightness, shortness of breath and wheezing.    Cardiovascular: Negative for chest pain and palpitations.   Gastrointestinal: Negative for abdominal pain, blood in stool and constipation.   Skin: Negative for color change.   Allergic/Immunologic: Negative for environmental allergies.  "  Neurological: Negative for dizziness, speech difficulty and headache.   Psychiatric/Behavioral: Negative for decreased concentration. The patient is not nervous/anxious.             Vital Signs  Vitals:    11/18/20 0936   BP: 130/82   BP Location: Left arm   Patient Position: Sitting   Cuff Size: Adult   Pulse: 94   Temp: 97.3 °F (36.3 °C)   Weight: 61.2 kg (135 lb)   Height: 162.6 cm (64.02\")   PainSc: 0-No pain     Body mass index is 23.16 kg/m².  Patient's Body mass index is 23.16 kg/m². BMI is within normal parameters. No follow-up required..     Advance Care Planning   ACP discussion was held with the patient during this visit. Patient has an advance directive in EMR which is still valid.        Current Outpatient Medications:   •  cholecalciferol (VITAMIN D3) 1000 units tablet, Take 1,000 Units by mouth Daily., Disp: , Rfl:   •  lisinopril (PRINIVIL,ZESTRIL) 20 MG tablet, TAKE 1 TABLET EVERY DAY , Disp: 90 tablet, Rfl: 1  •  Multiple Vitamins-Minerals (MULTIVITAMIN WITH MINERALS) tablet tablet, Take 1 tablet by mouth Daily., Disp: , Rfl:   •  Probiotic Product (PROBIOTIC-10) capsule, Take  by mouth Daily., Disp: , Rfl:   •  simvastatin (ZOCOR) 20 MG tablet, Take 1 tablet by mouth Every Evening., Disp: 90 tablet, Rfl: 3  •  temazepam (RESTORIL) 30 MG capsule, Take 1 capsule by mouth At Night As Needed for Sleep., Disp:  , Rfl:   •  Wheat Dextrin (Benefiber) powder, Take 1 each by mouth Daily., Disp: , Rfl:     Physical Exam     ACE III MINI         HEENT: Remote cataract extraction left eye has early cataract right eye no facial asymmetry pharynx is clear scalp lesion is healed from trauma fall approximately 5 weeks ago  NECK: No masses bruit thyromegaly or neck vein distention  CHEST: Clear without rales wheezes or rhonchi  CARDIAC: Regular rhythm without gallop rub or click  ABD: Liver spleen normal vertebral sounds no bruit  : Deferred  NEURO: Intact  PSYCH: Normal  EXTREM: Minimal arthritic change " primarily the knees low back  Skin: Clear     Results Review:    Recent Results (from the past 672 hour(s))   Microalbumin / Creatinine Urine Ratio -    Collection Time: 11/11/20  8:44 AM    Specimen: Blood    BLOOD  MANUAL DIFFEREN   Result Value Ref Range    Creatinine, Urine 76.8 Not Estab. mg/dL    Microalbumin, Urine 12.1 Not Estab. ug/mL    Microalbumin/Creatinine Ratio 16 0 - 29 mg/g creat   Vitamin D 25 Hydroxy    Collection Time: 11/11/20  8:44 AM    Specimen: Blood    BLOOD  MANUAL DIFFEREN   Result Value Ref Range    25 Hydroxy, Vitamin D 50.6 30.0 - 100.0 ng/ml   Urine Drug Screen -    Collection Time: 11/11/20  8:44 AM    Specimen: Blood    BLOOD  MANUAL DIFFEREN   Result Value Ref Range    Amphetamine, Urine Qual Negative Ydoboa=1229 ng/mL    Barbiturates Screen, Urine Negative Cqvkcu=180 ng/mL    Benzodiazepine Screen, Urine Negative Nhobeo=797 ng/mL    THC Screen, Urine Negative Cutoff=20 ng/mL    Cocaine Screen, Urine Negative Zfarrl=824 ng/mL    Opiate Screen, Urine Negative Yegfnb=020 ng/mL    Oxycodone/Oxymorphone, Urine Negative Dsqfgz=582 ng/mL    Phencyclidine (PCP), Urine Negative Cutoff=25 ng/mL    Methadone Screen, Urine Negative Vpeihq=165 ng/mL    Propoxyphene Screen Negative Mvptfa=351 ng/mL    Creatinine, Urine 80.4 20.0 - 300.0 mg/dL    pH, UA 8.1 4.5 - 8.9    Please note Comment    TSH Rfx On Abnormal To Free T4    Collection Time: 11/11/20  8:44 AM    Specimen: Blood    BLOOD  MANUAL DIFFEREN   Result Value Ref Range    TSH 1.110 0.270 - 4.200 uIU/mL   Lipid Panel    Collection Time: 11/11/20  8:44 AM    Specimen: Blood    BLOOD  MANUAL DIFFEREN   Result Value Ref Range    Total Cholesterol 199 0 - 200 mg/dL    Triglycerides 62 0 - 150 mg/dL    HDL Cholesterol 100 (H) 40 - 60 mg/dL    VLDL Cholesterol Romeo 11 5 - 40 mg/dL    LDL Chol Calc (NIH) 88 0 - 100 mg/dL   Comprehensive Metabolic Panel    Collection Time: 11/11/20  8:44 AM    Specimen: Blood    BLOOD  MANUAL DIFFEREN   Result  Value Ref Range    Glucose 89 65 - 99 mg/dL    BUN 15 8 - 23 mg/dL    Creatinine 0.78 0.57 - 1.00 mg/dL    eGFR Non African Am 72 >60 mL/min/1.73    eGFR African Am 87 >60 mL/min/1.73    BUN/Creatinine Ratio 19.2 7.0 - 25.0    Sodium 143 136 - 145 mmol/L    Potassium 4.5 3.5 - 5.2 mmol/L    Chloride 104 98 - 107 mmol/L    Total CO2 29.2 (H) 22.0 - 29.0 mmol/L    Calcium 9.7 8.6 - 10.5 mg/dL    Total Protein 6.9 6.0 - 8.5 g/dL    Albumin 4.60 3.50 - 5.20 g/dL    Globulin 2.3 gm/dL    A/G Ratio 2.0 g/dL    Total Bilirubin 0.4 0.0 - 1.2 mg/dL    Alkaline Phosphatase 105 39 - 117 U/L    AST (SGOT) 19 1 - 32 U/L    ALT (SGPT) 15 1 - 33 U/L   CBC & Differential    Collection Time: 11/11/20  8:44 AM    Specimen: Blood    BLOOD  MANUAL DIFFEREN   Result Value Ref Range    WBC 5.18 3.40 - 10.80 10*3/mm3    RBC 4.09 3.77 - 5.28 10*6/mm3    Hemoglobin 13.3 12.0 - 15.9 g/dL    Hematocrit 37.7 34.0 - 46.6 %    MCV 92.2 79.0 - 97.0 fL    MCH 32.5 26.6 - 33.0 pg    MCHC 35.3 31.5 - 35.7 g/dL    RDW 12.5 12.3 - 15.4 %    Platelets 261 140 - 450 10*3/mm3    Neutrophil Rel % 43.8 42.7 - 76.0 %    Lymphocyte Rel % 42.5 19.6 - 45.3 %    Monocyte Rel % 10.4 5.0 - 12.0 %    Eosinophil Rel % 2.5 0.3 - 6.2 %    Basophil Rel % 0.8 0.0 - 1.5 %    Neutrophils Absolute 2.27 1.70 - 7.00 10*3/mm3    Lymphocytes Absolute 2.20 0.70 - 3.10 10*3/mm3    Monocytes Absolute 0.54 0.10 - 0.90 10*3/mm3    Eosinophils Absolute 0.13 0.00 - 0.40 10*3/mm3    Basophils Absolute 0.04 0.00 - 0.20 10*3/mm3    Immature Granulocyte Rel % 0.0 0.0 - 0.5 %    Immature Grans Absolute 0.00 0.00 - 0.05 10*3/mm3    nRBC 0.0 0.0 - 0.2 /100 WBC       ECG 12 Lead    Date/Time: 11/18/2020 3:43 PM  Performed by: Wei Skelton MD  Authorized by: Wei Skelton MD   Comparison: not compared with previous ECG   Rhythm: sinus rhythm  Rate: normal  Conduction: conduction normal  ST Segments: ST segments normal  T Waves: T waves normal  QRS axis: normal    Clinical impression:  normal ECG  Comments: EKG sinus rhythm-normal asymptomatic        Patient Wellness Counseling:   Plan of care reviewed with patient at the conclusion of today's visit. Education was provided in regards to diagnosis, diet and exercise, cervical cancer screening, self breast exams, breast cancer screening, and the importance of yearly mammograms.   Nutrition, family planning/contraception, physical activity, healthy weight,ways to reduce stress, adequate sleep, injury prevention, misuse of tobacco, alcohol and drugs, sexual behavior and STD's, dental health, mental health, and immunizations.    Management and any prescribed or recommended OTC medications.  Patient verbalizes understanding of and agreement with management plan.      Medication Review: Medications reviewed and noted    Patient wellness counseling  Exercise: Continue physical activity and exercise  Diet: Healthy cardiac diet  Smoking: Non-smoker  Alcohol: Occasional wine  Screening: Recent lab discussed EKG discussed    Assessment/Plan:    Problem List Items Addressed This Visit        Cardiovascular and Mediastinum    Hyperlipidemia    Overview     History of mixed hyperlipidemia on simvastatin 20 mg daily no history of myalgia arthralgia          Current Assessment & Plan       History of mixed hyperlipidemia on simvastatin 20 mg daily denies myalgia arthralgia recent cholesterol was 199 and LDL was 88 with HDL of 100 suggest no change in therapy but careful carbohydrate.         Relevant Medications    simvastatin (ZOCOR) 20 MG tablet    Essential hypertension    Overview     History of essential hypertension with current blood pressure 122/80 on lisinopril 20 mg daily denies headache or cough blood pressure stable EKG is normal          Current Assessment & Plan     History of essential hypertension with current blood pressure 130/82 left and right sitting on lisinopril 20 mg daily denies headache or cough CMP lab is normal EKG is normal no change  therapy           Relevant Medications    lisinopril (PRINIVIL,ZESTRIL) 20 MG tablet    Other Relevant Orders    ECG 12 Lead       Digestive    Irritable bowel syndrome    Overview     Patient had a recent CT scan of abdomen pelvis, colonoscopy, EGD, and GYN referral no significant pathology was found patient is improved she is on probiotic she is on fiber she does feel better.         Current Assessment & Plan     Patient with irritable bowel currently stable on probiotic daily and Benefiber daily, with colonoscopy June 2019 currently stable and improved with normal bowel movements denies abdominal pain discomfort or soreness.            Nervous and Auditory    Acute midline thoracic back pain    Overview     Patient had recent fall approximately 5 weeks ago while playing pickle ball fell backwards with a 1 inch laceration of her scalp that healed, with residual upper thoracic lower cervical soreness discomfort which is mild heat has been beneficial and Tylenol NSAID therapy has been beneficial         Current Assessment & Plan     Patient had fall backwards with head scalp laceration and upper thoracic lower cervical spine back discomfort when she backed into a retaining wall while playing pickle ball the only residual has been some soreness in her upper back which is improving.  Suggest heat and Tylenol as needed            Musculoskeletal and Integument    Primary osteoarthritis involving multiple joints    Overview     Multi-joint knowledgeable arthritis disease with back pain and and bilateral knee discomfort overall doing well PRN NSAID therapy or Tylenol are beneficial         Current Assessment & Plan     History of mild degenerative arthritis disease primarily low back and bilateral knees overall feeling well continue as needed Tylenol or OTC NSAIDs.            Other    Insomnia, unspecified - Primary    Relevant Medications    temazepam (RESTORIL) 30 MG capsule           Patient Instructions   Recent lab  discussed  EKG discussed  Continue on medical therapy  Continue regular cardiac exercise and healthy cardiac diet  Return visit in 6 months or as needed  Follow-up with mammogram and DEXA scan  Follow-up with ophthalmology for maturing cataract right eye       Plan of care reviewed with patient at the conclusion of today's visit. Education was provided regarding diagnosis, management, and any prescribed or recommended OTC medications.Patient verbalizes understanding of and agreement with management plan.         Wei Skelton MD      Note: Part of this note may be an electronic transcription/translation of spoken language to printed text using the Dragon Dictation system.      Answers for HPI/ROS submitted by the patient on 11/15/2020   What is the primary reason for your visit?: Physical

## 2020-11-18 NOTE — ASSESSMENT & PLAN NOTE
History of essential hypertension with current blood pressure 130/82 left and right sitting on lisinopril 20 mg daily denies headache or cough CMP lab is normal EKG is normal no change therapy

## 2020-11-18 NOTE — ASSESSMENT & PLAN NOTE
Patient had fall backwards with head scalp laceration and upper thoracic lower cervical spine back discomfort when she backed into a retaining wall while playing pickle ball the only residual has been some soreness in her upper back which is improving.  Suggest heat and Tylenol as needed

## 2020-11-18 NOTE — ASSESSMENT & PLAN NOTE
History of mixed hyperlipidemia on simvastatin 20 mg daily denies myalgia arthralgia recent cholesterol was 199 and LDL was 88 with HDL of 100 suggest no change in therapy but careful carbohydrate.

## 2020-11-18 NOTE — ASSESSMENT & PLAN NOTE
History of mild degenerative arthritis disease primarily low back and bilateral knees overall feeling well continue as needed Tylenol or OTC NSAIDs.

## 2020-11-18 NOTE — PATIENT INSTRUCTIONS
Recent lab discussed  EKG discussed  Continue on medical therapy  Continue regular cardiac exercise and healthy cardiac diet  Return visit in 6 months or as needed  Follow-up with mammogram and DEXA scan  Follow-up with ophthalmology for maturing cataract right eye

## 2020-11-18 NOTE — ASSESSMENT & PLAN NOTE
Patient with irritable bowel currently stable on probiotic daily and Benefiber daily, with colonoscopy June 2019 currently stable and improved with normal bowel movements denies abdominal pain discomfort or soreness.

## 2020-11-23 RX ORDER — LISINOPRIL 20 MG/1
TABLET ORAL
Qty: 90 TABLET | Refills: 1 | Status: SHIPPED | OUTPATIENT
Start: 2020-11-23 | End: 2021-05-19

## 2021-02-15 ENCOUNTER — TELEPHONE (OUTPATIENT)
Dept: INTERNAL MEDICINE | Facility: CLINIC | Age: 76
End: 2021-02-15

## 2021-03-15 ENCOUNTER — APPOINTMENT (OUTPATIENT)
Dept: MAMMOGRAPHY | Facility: HOSPITAL | Age: 76
End: 2021-03-15

## 2021-03-15 ENCOUNTER — APPOINTMENT (OUTPATIENT)
Dept: BONE DENSITY | Facility: HOSPITAL | Age: 76
End: 2021-03-15

## 2021-05-13 ENCOUNTER — HOSPITAL ENCOUNTER (OUTPATIENT)
Dept: BONE DENSITY | Facility: HOSPITAL | Age: 76
Discharge: HOME | End: 2021-05-13

## 2021-05-13 ENCOUNTER — HOSPITAL ENCOUNTER (OUTPATIENT)
Dept: MAMMOGRAPHY | Facility: HOSPITAL | Age: 76
Discharge: HOME | End: 2021-05-13

## 2021-05-13 DIAGNOSIS — Z12.31 VISIT FOR SCREENING MAMMOGRAM: ICD-10-CM

## 2021-05-13 DIAGNOSIS — Z78.0 POSTMENOPAUSAL: ICD-10-CM

## 2021-05-13 PROCEDURE — 77063 BREAST TOMOSYNTHESIS BI: CPT

## 2021-05-13 PROCEDURE — 77080 DXA BONE DENSITY AXIAL: CPT

## 2021-05-13 PROCEDURE — 77067 SCR MAMMO BI INCL CAD: CPT | Performed by: RADIOLOGY

## 2021-05-13 PROCEDURE — 77063 BREAST TOMOSYNTHESIS BI: CPT | Performed by: RADIOLOGY

## 2021-05-13 PROCEDURE — 77067 SCR MAMMO BI INCL CAD: CPT

## 2021-05-19 ENCOUNTER — OFFICE VISIT (OUTPATIENT)
Dept: INTERNAL MEDICINE | Facility: CLINIC | Age: 76
End: 2021-05-19

## 2021-05-19 VITALS
HEIGHT: 64 IN | WEIGHT: 138 LBS | HEART RATE: 80 BPM | BODY MASS INDEX: 23.56 KG/M2 | TEMPERATURE: 98 F | DIASTOLIC BLOOD PRESSURE: 80 MMHG | SYSTOLIC BLOOD PRESSURE: 150 MMHG

## 2021-05-19 DIAGNOSIS — E78.2 MIXED HYPERLIPIDEMIA: ICD-10-CM

## 2021-05-19 DIAGNOSIS — F43.9 STRESS: ICD-10-CM

## 2021-05-19 DIAGNOSIS — I10 ESSENTIAL HYPERTENSION: Primary | ICD-10-CM

## 2021-05-19 PROCEDURE — 99214 OFFICE O/P EST MOD 30 MIN: CPT | Performed by: INTERNAL MEDICINE

## 2021-05-19 RX ORDER — LISINOPRIL 30 MG/1
30 TABLET ORAL DAILY
Qty: 90 TABLET | Refills: 3 | Status: SHIPPED | OUTPATIENT
Start: 2021-05-19 | End: 2022-07-18

## 2021-05-19 NOTE — PATIENT INSTRUCTIONS
No lab this visit  Increase lisinopril to 30 mg from 20  Continue temazepam at at bedtime  Continue healthy cardiac diet  Encouraged walking exercise  Continue all other medications  Return visit in 6 months for annual wellness visit and physical

## 2021-05-19 NOTE — PROGRESS NOTES
[facility]     Lucia Mc  1945   [id number]      Patient Care Team:  Wei Skelton MD as PCP - General  Wei Skelton MD as PCP - Family Medicine  Reginald Dove MD as Consulting Physician (Ophthalmology)  Sammy Delvalle MD as Consulting Physician (Gastroenterology)    Chief Complaint::   Chief Complaint   Patient presents with   • Hypertension     follow up            HPI  Patient 76-year-old female with mixed hyperlipidemia essential hypertension mild insomnia overall feeling well except with the stress of helping involvement of care with her ex- and his imminent demise.  She believes this is the reason for the mildly elevated blood pressure.  The patient is on probiotic simvastatin 20 lisinopril 20 temazepam 30 at at bedtime Benefiber and multiple vitamins.  She states she overall feels well denying headache or dizzy dysphagia cough or wheeze chest pain or pressure nausea vomiting or significant extremity discomfort denies edema      Patient Active Problem List   Diagnosis   • Insomnia, unspecified   • Post-menopausal   • Screening mammogram, encounter for   • Osteoporosis   • Hyperlipidemia   • Primary osteoarthritis involving multiple joints   • Early cataract   • Essential hypertension   • Vitamin D deficiency   • Left ear impacted cerumen   • Osteopenia   • Medicare annual wellness visit, subsequent   • Polyp of colon   • Alcohol use   • Kyphosis, acquired   • Histoplasmosis   • Lower abdominal pain   • Irritable bowel syndrome   • Cataract of left eye   • Acute midline thoracic back pain   • Stress        Past Medical History:   Diagnosis Date   • Arthritis    • Hyperlipidemia    • Hypertension    • Ovarian cyst 1977       Past Surgical History:   Procedure Laterality Date   • APPENDECTOMY     •  SECTION     • COLONOSCOPY  2009   • OVARIAN CYST SURGERY     • TUBAL ABDOMINAL LIGATION Bilateral        Family History   Problem Relation Age of Onset   • Breast  "cancer Maternal Aunt 70   • Pancreatic cancer Other    • COPD Other    • Diabetes Other    • Ovarian cancer Neg Hx    • Uterine cancer Neg Hx    • Endometrial cancer Neg Hx    • Colon cancer Neg Hx        Social History     Socioeconomic History   • Marital status:      Spouse name: Not on file   • Number of children: Not on file   • Years of education: Not on file   • Highest education level: Not on file   Tobacco Use   • Smoking status: Former Smoker     Packs/day: 0.25     Years: 1.00     Pack years: 0.25     Types: Cigarettes     Quit date: 1964     Years since quittin.4   • Smokeless tobacco: Never Used   • Tobacco comment: 1 CIGARETTES     Substance and Sexual Activity   • Alcohol use: Yes   • Drug use: No   • Sexual activity: Never     Birth control/protection: Other       No Known Allergies    Review of Systems     HEENT: Denies headache or dizzy  NECK: Denies pain or stiffness or dysphagia  CHEST: Denies cough or wheeze is a non-smoker no recent chest infections  CARDIAC: Denies chest pain pressure tightness palpitations is on lisinopril for blood pressure  ABD: Denies nausea vomiting or abdominal pain  : Denies dysuria frequency  NEURO: Denies syncope concussion or neuropathy  PSYCH: Stress and insomnia  EXTREM: Minimal arthritic change no edema    Vital Signs  Vitals:    21 0942   BP: 150/80   BP Location: Left arm   Patient Position: Sitting   Cuff Size: Adult   Pulse: 80   Temp: 98 °F (36.7 °C)   Weight: 62.6 kg (138 lb)   Height: 162.6 cm (64\")   PainSc: 0-No pain     Body mass index is 23.69 kg/m².  Patient's Body mass index is 23.69 kg/m². indicating that she is within normal range (BMI 18.5-24.9). No BMI management plan needed..     Advance Care Planning   ACP discussion was held with the patient during this visit. Patient has an advance directive in EMR which is still valid.        Current Outpatient Medications:   •  cholecalciferol (VITAMIN D3) 1000 units tablet, Take " 1,000 Units by mouth Daily., Disp: , Rfl:   •  Multiple Vitamins-Minerals (MULTIVITAMIN WITH MINERALS) tablet tablet, Take 1 tablet by mouth Daily., Disp: , Rfl:   •  Probiotic Product (PROBIOTIC-10) capsule, Take  by mouth Daily., Disp: , Rfl:   •  simvastatin (ZOCOR) 20 MG tablet, Take 1 tablet by mouth Every Evening., Disp: 90 tablet, Rfl: 3  •  temazepam (RESTORIL) 30 MG capsule, Take 1 capsule by mouth At Night As Needed for Sleep., Disp:  , Rfl:   •  Wheat Dextrin (Benefiber) powder, Take 1 each by mouth Daily., Disp: , Rfl:   •  lisinopril (PRINIVIL,ZESTRIL) 30 MG tablet, Take 1 tablet by mouth Daily., Disp: 90 tablet, Rfl: 3    Physical Exam     ACE III MINI         HEENT: No asymmetry pharynx is clear  NECK: No mass.  Thyromegaly  CHEST: Clear without rales or wheezing  CARDIAC: Regular rhythm no gallop or rub elevated blood pressure normal heart rate  ABD: Liver spleen normal positive bowel sounds no bruit or tenderness  : Deferred   NEURO: Intact  PSYCH: Mild stress anxiety and insomnia  EXTREM: No edema minimal arthritic change  Skin: Clear     Results Review:    No results found for this or any previous visit (from the past 672 hour(s)).  Procedures    Medication Review: Medications reviewed and noted    Patient wellness counseling  Exercise: Encouraged walking exercise   Diet: Healthy cardiac diet  Smoking: Non-smoker  Alcohol: Occasional alcohol  Screening:    Assessment/Plan:    Problem List Items Addressed This Visit        Cardiac and Vasculature    Hyperlipidemia    Overview     History of mixed hyperlipidemia on simvastatin 20 mg daily no history of myalgia arthralgia          Current Assessment & Plan       Mixed hyperlipidemia on simvastatin 20 mg daily continue therapy         Relevant Medications    simvastatin (ZOCOR) 20 MG tablet    Essential hypertension - Primary    Overview     History of essential hypertension with current blood pressure 122/80 on lisinopril 20 mg daily denies  headache or cough blood pressure stable EKG is normal          Current Assessment & Plan       Essential hypertension with initial blood pressure 150/80 repeated at 146/80 left and right arm patient is under fair amount of stress with her ex- and his eminent demise, and suggest increasing the lisinopril to a total of 30 mg daily, she can take 1.5 of the 20 mg tablets and I will send a new prescription for 30 mg tablets         Relevant Medications    lisinopril (PRINIVIL,ZESTRIL) 30 MG tablet       Mental Health    Stress    Overview     Patient is somewhat stressed over the eminent demise of her ex spouse and is involved in his care along with their son.  I believe this is a source of her mild elevation of blood pressure she is taking temazepam 30 at at bedtime for rest suggest no other changes in medication at this time.         Current Assessment & Plan     No additional medicines at this time we will continue temazepam 30 mg at at bedtime                Patient Instructions   No lab this visit  Increase lisinopril to 30 mg from 20  Continue temazepam at at bedtime  Continue healthy cardiac diet  Encouraged walking exercise  Continue all other medications  Return visit in 6 months for annual wellness visit and physical       Plan of care reviewed with patient at the conclusion of today's visit. Education was provided regarding diagnosis, management, and any prescribed or recommended OTC medications.Patient verbalizes understanding of and agreement with management plan.         Wei Skelton MD      Note: Part of this note may be an electronic transcription/translation of spoken language to printed text using the Dragon Dictation system.

## 2021-05-19 NOTE — ASSESSMENT & PLAN NOTE
Essential hypertension with initial blood pressure 150/80 repeated at 146/80 left and right arm patient is under fair amount of stress with her ex- and his eminent demise, and suggest increasing the lisinopril to a total of 30 mg daily, she can take 1.5 of the 20 mg tablets and I will send a new prescription for 30 mg tablets

## 2021-07-02 RX ORDER — SIMVASTATIN 20 MG
20 TABLET ORAL EVERY EVENING
Qty: 90 TABLET | Refills: 3 | Status: SHIPPED | OUTPATIENT
Start: 2021-07-02 | End: 2022-08-29

## 2021-10-20 ENCOUNTER — TELEPHONE (OUTPATIENT)
Dept: INTERNAL MEDICINE | Facility: CLINIC | Age: 76
End: 2021-10-20

## 2021-10-20 RX ORDER — AZITHROMYCIN 250 MG/1
TABLET, FILM COATED ORAL
Qty: 6 TABLET | Refills: 0 | Status: SHIPPED | OUTPATIENT
Start: 2021-10-20 | End: 2022-06-09

## 2021-10-20 NOTE — TELEPHONE ENCOUNTER
Message noted I sent a Z-Hardy to [facility] in [place] please inform patient and to be sure and take over-the-counter Zyrtec or Claritin or Allegra please inform patient

## 2021-10-20 NOTE — TELEPHONE ENCOUNTER
Caller: Lucia Mc    Relationship: Self    Best call back number: [phone]    What medication are you requesting: SOMETHING FOR SYMPTOMS     What are your current symptoms: RUNNY NOSE, NASAL DRAINAGE, SORE THROAT     How long have you been experiencing symptoms: 10/19/21    Have you had these symptoms before:    [x] Yes  [] No    Have you been treated for these symptoms before:   [x] Yes  [] No    If a prescription is needed, what is your preferred pharmacy and phone number: [facility] [address] - [address] [place] [address] AT [facility] [facility] [address] - [phone] [facility] [phone]      Additional notes: PATIENT STATES THAT SHE LEAVES ON A PLANE IN THE MORNING AND WOULD LIKE SOMETHING CALLED IN FOR HER TODAY.

## 2021-11-17 ENCOUNTER — TELEPHONE (OUTPATIENT)
Dept: INTERNAL MEDICINE | Facility: CLINIC | Age: 76
End: 2021-11-17

## 2021-11-17 DIAGNOSIS — I10 ESSENTIAL HYPERTENSION: ICD-10-CM

## 2021-11-17 DIAGNOSIS — E78.2 MIXED HYPERLIPIDEMIA: Primary | ICD-10-CM

## 2021-11-17 NOTE — TELEPHONE ENCOUNTER
Patient requesting labs before 11/23/21 AWV appt.    Labs pended to Dr. Skelton for approval and sign-off.

## 2021-11-17 NOTE — TELEPHONE ENCOUNTER
Patient would like her lab orders entered before her 11/23/21 appt.    Please call patient when orders are entered at [phone].

## 2021-11-21 PROBLEM — Z00.00 MEDICARE ANNUAL WELLNESS VISIT, SUBSEQUENT: Status: ACTIVE | Noted: 2021-11-21

## 2021-11-23 ENCOUNTER — OFFICE VISIT (OUTPATIENT)
Dept: INTERNAL MEDICINE | Facility: CLINIC | Age: 76
End: 2021-11-23

## 2021-11-23 ENCOUNTER — LAB (OUTPATIENT)
Dept: LAB | Facility: HOSPITAL | Age: 76
End: 2021-11-23

## 2021-11-23 VITALS
BODY MASS INDEX: 23.56 KG/M2 | WEIGHT: 138 LBS | DIASTOLIC BLOOD PRESSURE: 84 MMHG | SYSTOLIC BLOOD PRESSURE: 136 MMHG | HEIGHT: 64 IN | HEART RATE: 90 BPM | TEMPERATURE: 97.7 F

## 2021-11-23 DIAGNOSIS — I10 ESSENTIAL HYPERTENSION: ICD-10-CM

## 2021-11-23 DIAGNOSIS — E78.2 MIXED HYPERLIPIDEMIA: ICD-10-CM

## 2021-11-23 DIAGNOSIS — E55.9 VITAMIN D DEFICIENCY: ICD-10-CM

## 2021-11-23 DIAGNOSIS — M15.9 PRIMARY OSTEOARTHRITIS INVOLVING MULTIPLE JOINTS: ICD-10-CM

## 2021-11-23 DIAGNOSIS — K58.9 IRRITABLE BOWEL SYNDROME, UNSPECIFIED TYPE: ICD-10-CM

## 2021-11-23 DIAGNOSIS — Z00.00 MEDICARE ANNUAL WELLNESS VISIT, SUBSEQUENT: Primary | ICD-10-CM

## 2021-11-23 DIAGNOSIS — G47.00 INSOMNIA, UNSPECIFIED TYPE: ICD-10-CM

## 2021-11-23 LAB
ALBUMIN SERPL-MCNC: 4.5 G/DL (ref 3.5–5.2)
ALBUMIN/GLOB SERPL: 2 G/DL
ALP SERPL-CCNC: 112 U/L (ref 39–117)
ALT SERPL-CCNC: 11 U/L (ref 1–33)
AST SERPL-CCNC: 18 U/L (ref 1–32)
BASOPHILS # BLD AUTO: 0.06 10*3/MM3 (ref 0–0.2)
BASOPHILS NFR BLD AUTO: 1.1 % (ref 0–1.5)
BILIRUB SERPL-MCNC: 0.4 MG/DL (ref 0–1.2)
BUN SERPL-MCNC: 10 MG/DL (ref 8–23)
BUN/CREAT SERPL: 13.3 (ref 7–25)
CALCIUM SERPL-MCNC: 9.5 MG/DL (ref 8.6–10.5)
CHLORIDE SERPL-SCNC: 104 MMOL/L (ref 98–107)
CHOLEST SERPL-MCNC: 197 MG/DL (ref 0–200)
CO2 SERPL-SCNC: 28.5 MMOL/L (ref 22–29)
CREAT SERPL-MCNC: 0.75 MG/DL (ref 0.57–1)
EOSINOPHIL # BLD AUTO: 0.17 10*3/MM3 (ref 0–0.4)
EOSINOPHIL NFR BLD AUTO: 3.1 % (ref 0.3–6.2)
ERYTHROCYTE [DISTWIDTH] IN BLOOD BY AUTOMATED COUNT: 12.1 % (ref 12.3–15.4)
GLOBULIN SER CALC-MCNC: 2.3 GM/DL
GLUCOSE SERPL-MCNC: 92 MG/DL (ref 65–99)
HCT VFR BLD AUTO: 42 % (ref 34–46.6)
HDLC SERPL-MCNC: 85 MG/DL (ref 40–60)
HGB BLD-MCNC: 14 G/DL (ref 12–15.9)
IMM GRANULOCYTES # BLD AUTO: 0.02 10*3/MM3 (ref 0–0.05)
IMM GRANULOCYTES NFR BLD AUTO: 0.4 % (ref 0–0.5)
LDLC SERPL CALC-MCNC: 94 MG/DL (ref 0–100)
LYMPHOCYTES # BLD AUTO: 2.18 10*3/MM3 (ref 0.7–3.1)
LYMPHOCYTES NFR BLD AUTO: 40.1 % (ref 19.6–45.3)
MCH RBC QN AUTO: 31.1 PG (ref 26.6–33)
MCHC RBC AUTO-ENTMCNC: 33.3 G/DL (ref 31.5–35.7)
MCV RBC AUTO: 93.3 FL (ref 79–97)
MONOCYTES # BLD AUTO: 0.45 10*3/MM3 (ref 0.1–0.9)
MONOCYTES NFR BLD AUTO: 8.3 % (ref 5–12)
NEUTROPHILS # BLD AUTO: 2.56 10*3/MM3 (ref 1.7–7)
NEUTROPHILS NFR BLD AUTO: 47 % (ref 42.7–76)
NRBC BLD AUTO-RTO: 0 /100 WBC (ref 0–0.2)
PLATELET # BLD AUTO: 246 10*3/MM3 (ref 140–450)
POTASSIUM SERPL-SCNC: 4.4 MMOL/L (ref 3.5–5.2)
PROT SERPL-MCNC: 6.8 G/DL (ref 6–8.5)
RBC # BLD AUTO: 4.5 10*6/MM3 (ref 3.77–5.28)
SODIUM SERPL-SCNC: 139 MMOL/L (ref 136–145)
TRIGL SERPL-MCNC: 101 MG/DL (ref 0–150)
TSH SERPL DL<=0.005 MIU/L-ACNC: 2.09 UIU/ML (ref 0.27–4.2)
VLDLC SERPL CALC-MCNC: 18 MG/DL (ref 5–40)
WBC # BLD AUTO: 5.44 10*3/MM3 (ref 3.4–10.8)

## 2021-11-23 PROCEDURE — 1159F MED LIST DOCD IN RCRD: CPT | Performed by: INTERNAL MEDICINE

## 2021-11-23 PROCEDURE — 96160 PT-FOCUSED HLTH RISK ASSMT: CPT | Performed by: INTERNAL MEDICINE

## 2021-11-23 PROCEDURE — 1126F AMNT PAIN NOTED NONE PRSNT: CPT | Performed by: INTERNAL MEDICINE

## 2021-11-23 PROCEDURE — 93000 ELECTROCARDIOGRAM COMPLETE: CPT | Performed by: INTERNAL MEDICINE

## 2021-11-23 PROCEDURE — G0439 PPPS, SUBSEQ VISIT: HCPCS | Performed by: INTERNAL MEDICINE

## 2021-11-23 PROCEDURE — 99213 OFFICE O/P EST LOW 20 MIN: CPT | Performed by: INTERNAL MEDICINE

## 2021-11-23 PROCEDURE — 1170F FXNL STATUS ASSESSED: CPT | Performed by: INTERNAL MEDICINE

## 2021-11-23 NOTE — ASSESSMENT & PLAN NOTE
Well controlled she is to continue taking the lisinopril 30 mg daily. She will continue her cardiac diet, and follow-up in 6 months.

## 2021-11-23 NOTE — PROGRESS NOTES
QUICK REFERENCE INFORMATION:  The ABCs of the Annual Wellness Visit    Subsequent Medicare Wellness Visit    HEALTH RISK ASSESSMENT    1945    Recent Hospitalizations:  No hospitalization(s) within the last year..        Current Medical Providers:  Patient Care Team:  Wei Skelton MD as PCP - General  Wei Skelton MD as PCP - Family Medicine  Reginald Dove MD as Consulting Physician (Ophthalmology)  Sammy Delvalle MD as Consulting Physician (Gastroenterology)        Smoking Status:  Social History     Tobacco Use   Smoking Status Former Smoker   • Packs/day: 0.25   • Years: 1.00   • Pack years: 0.25   • Types: Cigarettes   • Quit date: 1964   • Years since quittin.9   Smokeless Tobacco Never Used   Tobacco Comment    1 CIGARETTES         Alcohol Consumption:  Social History     Substance and Sexual Activity   Alcohol Use Yes       Depression Screen:   PHQ-2/PHQ-9 Depression Screening 2021   Little interest or pleasure in doing things 0   Feeling down, depressed, or hopeless 0   Total Score 0       Health Habits and Functional and Cognitive Screening:  Functional & Cognitive Status 2021   Do you have difficulty preparing food and eating? No   Do you have difficulty bathing yourself, getting dressed or grooming yourself? No   Do you have difficulty using the toilet? No   Do you have difficulty moving around from place to place? No   Do you have trouble with steps or getting out of a bed or a chair? No   Current Diet Well Balanced Diet   Dental Exam Up to date   Eye Exam Not up to date   Exercise (times per week) 3 times per week   Current Exercises Include Walking   Current Exercise Activities Include -   Do you need help using the phone?  No   Are you deaf or do you have serious difficulty hearing?  No   Do you need help with transportation? No   Do you need help shopping? No   Do you need help preparing meals?  No   Do you need help with housework?  No   Do you need help with laundry?  No   Do you need help taking your medications? No   Do you need help managing money? No   Do you ever drive or ride in a car without wearing a seat belt? No   Have you felt unusual stress, anger or loneliness in the last month? No   Who do you live with? Alone   If you need help, do you have trouble finding someone available to you? No   Have you been bothered in the last four weeks by sexual problems? No   Do you have difficulty concentrating, remembering or making decisions? No       Fall Risk Screen:  ANISA Fall Risk Assessment was completed, and patient is at LOW risk for falls.Assessment completed on:11/23/2021    ACE III MINI        Does the patient have evidence of cognitive impairment? No    Aspirin use counseling: Does not need ASA (and currently is not on it)    Recent Lab Results:  CMP:  Lab Results   Component Value Date    BUN 10 11/23/2021    CREATININE 0.75 11/23/2021    EGFRIFNONA 75 11/23/2021    EGFRIFAFRI 91 11/23/2021    BCR 13.3 11/23/2021     11/23/2021    K 4.4 11/23/2021    CO2 28.5 11/23/2021    CALCIUM 9.5 11/23/2021    PROTENTOTREF 6.8 11/23/2021    ALBUMIN 4.50 11/23/2021    LABGLOBREF 2.3 11/23/2021    LABIL2 2.0 11/23/2021    BILITOT 0.4 11/23/2021    ALKPHOS 112 11/23/2021    AST 18 11/23/2021    ALT 11 11/23/2021     HbA1c:  No results found for: HGBA1C  Microalbumin:  Lab Results   Component Value Date    MICROALBUR 12.1 11/11/2020     Lipid Panel  Lab Results   Component Value Date    TRIG 101 11/23/2021    HDL 85 (H) 11/23/2021    LDL 94 11/23/2021    AST 18 11/23/2021    ALT 11 11/23/2021       Visual Acuity:  No exam data present    Age-appropriate Screening Schedule:  Refer to the list below for future screening recommendations based on patient's age, sex and/or medical conditions. Orders for these recommended tests are listed in the plan section. The patient has been provided with a written plan.    Health Maintenance   Topic Date Due   • ZOSTER VACCINE (2 of 3)  "10/10/2016   • LIPID PANEL  11/23/2022   • MAMMOGRAM  05/13/2023   • DXA SCAN  05/13/2023   • TDAP/TD VACCINES (2 - Td or Tdap) 04/20/2025   • INFLUENZA VACCINE  Completed        Subjective   History of Present Illness    Lucia Mc is a 76 y.o. female who presents for a Subsequent Wellness Visit.    The patient reported that she had surgery for cataract for her left eye, but believes she needs to be seen via Dr. Dove for her right eye as well. She stated that she has difficulty driving at night.     She states that she previously had a severe \"cold\" and was given a Z-Hardy, which was helpful.     The patient states that she had her mammogram in 03/2021 and she goes annually. She denies being a former smoker. The patient notes drinking a significant amount of water.  She reports only having back pain if she is lifting a heavy item.    The patient reports that her blood pressure is well controlled with lisinopril 30 mg. Her blood pressure today was 136/84 with a heart rate of 90 pm. She believes her previous high blood pressure was due to stress. She states that she prefers to take Aleve versus Advil.     The patient reports that the arthritis in her left hand has gotten slightly worse, especially when she is opening a lid.  She denies having severe soreness and feeling uncomfortable at the end of the day due to her arthritis.     The patient's irritable bowel is well controlled. She was told by Dr. Cooley to put Benefiber in her daily coffee, which seems to be helping. She knows to avoid spicy foods, as they tend to trigger symptoms. She denies any abdominal pain or discomfort.     The patient reports that her insomnia is well controlled and she only occasionally takes temazepam.     The patient received her COVID-19 booster vaccine as well as the current influenza vaccine.     She denies any urinary incontinence, joint pain or discomfort, lower back or knee pain, lung infections or irritations, nor breast " pain or soreness.    CHRONIC CONDITIONS    The following portions of the patient's history were reviewed and updated as appropriate: allergies, current medications, past family history, past medical history, past social history and past surgical history.    Outpatient Medications Prior to Visit   Medication Sig Dispense Refill   • cholecalciferol (VITAMIN D3) 1000 units tablet Take 1,000 Units by mouth Daily.     • lisinopril (PRINIVIL,ZESTRIL) 30 MG tablet Take 1 tablet by mouth Daily. 90 tablet 3   • Multiple Vitamins-Minerals (MULTIVITAMIN WITH MINERALS) tablet tablet Take 1 tablet by mouth Daily.     • Probiotic Product (PROBIOTIC-10) capsule Take  by mouth Daily.     • simvastatin (ZOCOR) 20 MG tablet Take 1 tablet by mouth Every Evening. 90 tablet 3   • temazepam (RESTORIL) 30 MG capsule Take 1 capsule by mouth At Night As Needed for Sleep.     • Wheat Dextrin (Benefiber) powder Take 1 each by mouth Daily.     • azithromycin (Zithromax Z-Hardy) 250 MG tablet Take 2 tablets by mouth on day 1, then 1 tablet daily on days 2-5 6 tablet 0     No facility-administered medications prior to visit.       Patient Active Problem List   Diagnosis   • Insomnia, unspecified   • Post-menopausal   • Screening mammogram, encounter for   • Osteoporosis   • Hyperlipidemia   • Primary osteoarthritis involving multiple joints   • Early cataract   • Essential hypertension   • Vitamin D deficiency   • Left ear impacted cerumen   • Osteopenia   • Medicare annual wellness visit, subsequent   • Polyp of colon   • Alcohol use   • Kyphosis, acquired   • Histoplasmosis   • Lower abdominal pain   • Irritable bowel syndrome   • Cataract of left eye   • Acute midline thoracic back pain   • Stress   • Medicare annual wellness visit, subsequent       Advance Care Planning:  ACP discussion was held with the patient during this visit. Patient has an advance directive in EMR which is still valid.     Identification of Risk Factors:  Risk factors  include: Cardiovascular risk.    Review of Systems   Constitutional: Negative for chills, fatigue and fever.   HENT: Negative for congestion, ear pain and sinus pressure.    Respiratory: Negative for cough, chest tightness, shortness of breath and wheezing.    Cardiovascular: Negative for chest pain and palpitations.   Gastrointestinal: Negative for abdominal pain, blood in stool and constipation.   Skin: Negative for color change.   Allergic/Immunologic: Negative for environmental allergies.   Neurological: Negative for dizziness, speech difficulty and headaches.   Psychiatric/Behavioral: Negative for confusion. The patient is not nervous/anxious.        Compared to one year ago, the patient feels her physical health is the same.  Compared to one year ago, the patient feels her mental health is the same.    Objective     Physical Exam  Vitals reviewed.   Constitutional:       Appearance: She is well-developed.   HENT:      Head: Normocephalic and atraumatic.   Cardiovascular:      Rate and Rhythm: Normal rate and regular rhythm.      Heart sounds: Normal heart sounds. No murmur heard.      Pulmonary:      Effort: Pulmonary effort is normal.      Breath sounds: Normal breath sounds.   Neurological:      Mental Status: She is alert and oriented to person, place, and time.            ECG 12 Lead    Date/Time: 11/23/2021 6:57 PM  Performed by: Wei Skelton MD  Authorized by: Wei Skelton MD   Comparison: compared with previous ECG from 11/18/2020  Similar to previous ECG  Comparison to previous ECG: EKG is normal sinus rhythm with no ischemia similar to EKG of November 18, 2020  Rhythm: sinus rhythm  Rate: normal  Conduction: conduction normal  ST Segments: ST segments normal  T Waves: T waves normal  QRS axis: normal  Other: no other findings  Lead: right-sided leads used    Clinical impression: normal ECG  Comments: EKG is normal sinus rhythm with no ischemia similar to EKG from 4/18/2020             Vitals:     "11/23/21 0948   BP: 136/84   BP Location: Left arm   Patient Position: Sitting   Cuff Size: Adult   Pulse: 90   Temp: 97.7 °F (36.5 °C)   Weight: 62.6 kg (138 lb)   Height: 162.6 cm (64\")   PainSc: 0-No pain       Patient's Body mass index is 23.69 kg/m². indicating that she is within normal range (BMI 18.5-24.9). No BMI management plan needed..      Assessment/Plan   Problem List Items Addressed This Visit        Cardiac and Vasculature    Hyperlipidemia    Overview     History of mixed hyperlipidemia on simvastatin 20 mg daily no history of myalgia arthralgia          Current Assessment & Plan     The patient is to continue taking simvastatin 20 mg, and we will get lab work for CMP and lipid. We will also get a CBC and thyroid.            Relevant Medications    simvastatin (ZOCOR) 20 MG tablet    Other Relevant Orders    CBC & Differential    Comprehensive Metabolic Panel    Lipid Panel    TSH    Vitamin D 25 Hydroxy    Essential hypertension    Overview     History of essential hypertension with current blood pressure 122/80 on lisinopril 20 mg daily denies headache or cough blood pressure stable EKG is normal          Current Assessment & Plan     Well controlled she is to continue taking the lisinopril 30 mg daily. She will continue her cardiac diet, and follow-up in 6 months.         Relevant Medications    lisinopril (PRINIVIL,ZESTRIL) 30 MG tablet    Other Relevant Orders    CBC & Differential    Comprehensive Metabolic Panel    Lipid Panel    TSH    Vitamin D 25 Hydroxy    ECG 12 Lead       Endocrine and Metabolic    Vitamin D deficiency    Overview     History of vitamin D deficiency on vitamin D3 1000 units daily and multiple vitamins with current level normal at 35.5 suggest continue therapy.         Current Assessment & Plan     She is to continue taking 1000 units of vitamin D daily.          Relevant Orders    CBC & Differential    Comprehensive Metabolic Panel    Lipid Panel    TSH    Vitamin D 25 " Hydroxy       Gastrointestinal Abdominal     Irritable bowel syndrome    Overview     Patient had a recent CT scan of abdomen pelvis, colonoscopy, EGD, and GYN referral no significant pathology was found patient is improved she is on probiotic she is on fiber she does feel better.         Current Assessment & Plan     Currently stable on Benefiber continue therapy         Relevant Orders    CBC & Differential    Comprehensive Metabolic Panel    Lipid Panel    TSH    Vitamin D 25 Hydroxy       Health Encounters    Medicare annual wellness visit, subsequent - Primary    Overview     76-year-old female presents for Medicare annual wellness visit and physical exam on November 23, 2021         Relevant Orders    CBC & Differential    Comprehensive Metabolic Panel    Lipid Panel    TSH    Vitamin D 25 Hydroxy       Musculoskeletal and Injuries    Primary osteoarthritis involving multiple joints    Overview     Multi-joint knowledgeable arthritis disease with back pain and and bilateral knee discomfort overall doing well PRN NSAID therapy or Tylenol are beneficial         Current Assessment & Plan      If she is going  to have a very active day she can take 1 over-the-counter 220 mg Aleve, with food. She is to continue exercise.          Relevant Orders    CBC & Differential    Comprehensive Metabolic Panel    Lipid Panel    TSH    Vitamin D 25 Hydroxy       Sleep    Insomnia, unspecified    Overview     Mild insomnia on temazepam 30 mg at at bedtime         Current Assessment & Plan     Continue temazepam 30 mg as needed         Relevant Medications    temazepam (RESTORIL) 30 MG capsule    Other Relevant Orders    CBC & Differential    Comprehensive Metabolic Panel    Lipid Panel    TSH    Vitamin D 25 Hydroxy          Patient Self-Management and Personalized Health Advice  The patient has been provided with information about: prevention of cardiac or vascular disease and preventive services including:   · Annual Wellness  Visit (AWV).    Outpatient Encounter Medications as of 11/23/2021   Medication Sig Dispense Refill   • cholecalciferol (VITAMIN D3) 1000 units tablet Take 1,000 Units by mouth Daily.     • lisinopril (PRINIVIL,ZESTRIL) 30 MG tablet Take 1 tablet by mouth Daily. 90 tablet 3   • Multiple Vitamins-Minerals (MULTIVITAMIN WITH MINERALS) tablet tablet Take 1 tablet by mouth Daily.     • Probiotic Product (PROBIOTIC-10) capsule Take  by mouth Daily.     • simvastatin (ZOCOR) 20 MG tablet Take 1 tablet by mouth Every Evening. 90 tablet 3   • temazepam (RESTORIL) 30 MG capsule Take 1 capsule by mouth At Night As Needed for Sleep.     • Wheat Dextrin (Benefiber) powder Take 1 each by mouth Daily.     • azithromycin (Zithromax Z-Hardy) 250 MG tablet Take 2 tablets by mouth on day 1, then 1 tablet daily on days 2-5 6 tablet 0     No facility-administered encounter medications on file as of 11/23/2021.       Reviewed use of high risk medication in the elderly: yes  Reviewed for potential of harmful drug interactions in the elderly: yes    Follow Up:  Return in about 6 months (around 5/23/2022) for Recheck, labs today.     There are no Patient Instructions on file for this visit.    An After Visit Summary and PPPS with all of these plans were given to the patient.          Transcribed from ambient dictation for Wei Skelton MD by Danielle Higuera.   11/23/21   15:58 EST    Patient verbalized consent to the visit recording.  I have personally performed the services described in this document as transcribed by the above individual, and it is both accurate and complete.  Wei Skelton MD  11/23/2021  18:59 EST

## 2021-11-23 NOTE — ASSESSMENT & PLAN NOTE
The patient is to continue taking simvastatin 20 mg, and we will get lab work for CMP and lipid. We will also get a CBC and thyroid.

## 2021-11-23 NOTE — ASSESSMENT & PLAN NOTE
If she is going  to have a very active day she can take 1 over-the-counter 220 mg Aleve, with food. She is to continue exercise.

## 2022-03-11 ENCOUNTER — TRANSCRIBE ORDERS (OUTPATIENT)
Dept: ADMINISTRATIVE | Facility: HOSPITAL | Age: 77
End: 2022-03-11

## 2022-03-11 DIAGNOSIS — Z12.31 SCREENING MAMMOGRAM FOR BREAST CANCER: Primary | ICD-10-CM

## 2022-04-29 ENCOUNTER — TELEPHONE (OUTPATIENT)
Dept: INTERNAL MEDICINE | Facility: CLINIC | Age: 77
End: 2022-04-29

## 2022-04-30 NOTE — TELEPHONE ENCOUNTER
Do not cancel labs, she has a 6-month appointment May 25 and will need fasting labs for that visit which were placed in November.

## 2022-05-18 ENCOUNTER — HOSPITAL ENCOUNTER (OUTPATIENT)
Dept: MAMMOGRAPHY | Facility: HOSPITAL | Age: 77
Discharge: HOME | End: 2022-05-18
Admitting: INTERNAL MEDICINE

## 2022-05-18 DIAGNOSIS — Z12.31 SCREENING MAMMOGRAM FOR BREAST CANCER: ICD-10-CM

## 2022-05-18 PROCEDURE — 77063 BREAST TOMOSYNTHESIS BI: CPT

## 2022-05-18 PROCEDURE — 77067 SCR MAMMO BI INCL CAD: CPT

## 2022-05-18 PROCEDURE — 77067 SCR MAMMO BI INCL CAD: CPT | Performed by: RADIOLOGY

## 2022-05-18 PROCEDURE — 77063 BREAST TOMOSYNTHESIS BI: CPT | Performed by: RADIOLOGY

## 2022-05-31 ENCOUNTER — TELEPHONE (OUTPATIENT)
Dept: INTERNAL MEDICINE | Facility: CLINIC | Age: 77
End: 2022-05-31

## 2022-05-31 NOTE — TELEPHONE ENCOUNTER
Patient called to reschedule her appointment due to testing COVID and Flu positive on 05/30/2022, the appointment was on Thursday 06/02/2022 and has been moved to Thursday 06/09/2022.    She stated that as of 05/30/2022 she has been 10 days symptomatic with symptoms starting around 05/20/2022

## 2022-06-09 ENCOUNTER — OFFICE VISIT (OUTPATIENT)
Dept: INTERNAL MEDICINE | Facility: CLINIC | Age: 77
End: 2022-06-09

## 2022-06-09 ENCOUNTER — LAB (OUTPATIENT)
Dept: LAB | Facility: HOSPITAL | Age: 77
End: 2022-06-09

## 2022-06-09 VITALS
BODY MASS INDEX: 23.73 KG/M2 | WEIGHT: 139 LBS | DIASTOLIC BLOOD PRESSURE: 84 MMHG | SYSTOLIC BLOOD PRESSURE: 120 MMHG | HEIGHT: 64 IN | HEART RATE: 80 BPM

## 2022-06-09 DIAGNOSIS — G47.00 INSOMNIA, UNSPECIFIED TYPE: ICD-10-CM

## 2022-06-09 DIAGNOSIS — U07.1 COVID-19 VIRUS INFECTION: Primary | ICD-10-CM

## 2022-06-09 DIAGNOSIS — I10 ESSENTIAL HYPERTENSION: ICD-10-CM

## 2022-06-09 DIAGNOSIS — E78.2 MIXED HYPERLIPIDEMIA: ICD-10-CM

## 2022-06-09 DIAGNOSIS — E55.9 VITAMIN D DEFICIENCY: ICD-10-CM

## 2022-06-09 DIAGNOSIS — K58.9 IRRITABLE BOWEL SYNDROME, UNSPECIFIED TYPE: ICD-10-CM

## 2022-06-09 DIAGNOSIS — J11.1 FLU: ICD-10-CM

## 2022-06-09 DIAGNOSIS — Z00.00 MEDICARE ANNUAL WELLNESS VISIT, SUBSEQUENT: ICD-10-CM

## 2022-06-09 DIAGNOSIS — M15.9 PRIMARY OSTEOARTHRITIS INVOLVING MULTIPLE JOINTS: ICD-10-CM

## 2022-06-09 PROCEDURE — 99214 OFFICE O/P EST MOD 30 MIN: CPT | Performed by: INTERNAL MEDICINE

## 2022-06-09 NOTE — ASSESSMENT & PLAN NOTE
She will continue to take lisinopril as prescribed.  Essential hypertension with current blood pressure 120/84 continue lisinopril 30, fasting CMP is pending

## 2022-06-09 NOTE — PATIENT INSTRUCTIONS
Fasting labs drawn earlier this morning are pending  Continue all current medications  Continue excellent cardiac healthy diet and exercise  Prescriptions reviewed and no refills needed  Return visit for annual wellness in 6 months

## 2022-06-09 NOTE — PROGRESS NOTES
[facility]     Lucia Mc  1945   [id number]      Patient Care Team:  Wei Skelton MD as PCP - General  Wei Skelton MD as PCP - Family Medicine  Reginald Dove MD as Consulting Physician (Ophthalmology)  Sammy Delvalle MD as Consulting Physician (Gastroenterology)  Becca Rico MD as Consulting Physician (Dermatology)    Chief Complaint::   Chief Complaint   Patient presents with   • s/p covid and flu     About 2.5 weeks ago.  States her worst symptoms were from the flu.  Some head congestion   • Hyperlipidemia     Follow up.  Labs collected this morning            HPI    The patient is a 77-year-old female with recent COVID-19 and flu infection approximately 2.5 weeks ago, stating her worst symptoms were from the flu with some continued mild head congestion with a history of mixed hyperlipidemia, having had fasting lab work drawn earlier this day.    The patient states that she was had both COVID-19 and influenza about 2.5 weeks ago. She states that she has been vaccinated and had 1 booster. She states that she had chills, fever, and aching. The patient states she thinks it was the influenza that caused her to really feel bad. She denies any stomach trouble, shortness of breath, or any loss of taste or smell. She states that she did not have any congestion with the flue and COVID-19. The patient states she has had a little congestion in the past week, and she thinks it is allergies because she has been in flowers in the garden a lot.     The patient denies any headache or dizziness. She states that she has a little of sinus drainage from allergies. She denies any trouble swallowing. The patient denies any production of sputum. She denies any wheezing, coughing, racing, heart skipping, or palpitations. She states that her stomach has been fine. The patient reports that she had her mammogram on 05/18/2021. She states that she tries to get a body scan every year.    Patient  Active Problem List   Diagnosis   • Insomnia, unspecified   • Post-menopausal   • Screening mammogram, encounter for   • Osteoporosis   • Hyperlipidemia   • Primary osteoarthritis involving multiple joints   • Early cataract   • Essential hypertension   • Vitamin D deficiency   • Left ear impacted cerumen   • Osteopenia   • Medicare annual wellness visit, subsequent   • Polyp of colon   • Alcohol use   • Kyphosis, acquired   • Histoplasmosis   • Lower abdominal pain   • Irritable bowel syndrome   • Cataract of left eye   • Acute midline thoracic back pain   • Stress   • Medicare annual wellness visit, subsequent   • COVID-19 virus infection   • Flu        Past Medical History:   Diagnosis Date   • Arthritis    • Hyperlipidemia    • Hypertension    • Ovarian cyst        Past Surgical History:   Procedure Laterality Date   • APPENDECTOMY     •  SECTION     • COLONOSCOPY  2009   • OVARIAN CYST SURGERY     • TUBAL ABDOMINAL LIGATION Bilateral        Family History   Problem Relation Age of Onset   • Breast cancer Maternal Aunt 70   • Pancreatic cancer Other    • COPD Other    • Diabetes Other    • Ovarian cancer Neg Hx    • Uterine cancer Neg Hx    • Endometrial cancer Neg Hx    • Colon cancer Neg Hx        Social History     Socioeconomic History   • Marital status:    Tobacco Use   • Smoking status: Former Smoker     Packs/day: 0.25     Years: 1.00     Pack years: 0.25     Types: Cigarettes     Quit date: 1964     Years since quittin.4   • Smokeless tobacco: Never Used   • Tobacco comment: 1 CIGARETTES     Substance and Sexual Activity   • Alcohol use: Yes   • Drug use: No   • Sexual activity: Never     Birth control/protection: Other       No Known Allergies    Review of Systems     HEENT: Denies any hearing changes, eyesight changes, no headache or dizziness, reports sinus drainage  NECK: Denies any pain, stiffness, swelling or dysphagia  CHEST: Denies cough or wheeze or recent lung  "infections mild drainage infrequent cough  CARDIAC: Denies chest pain, pressure or palpitations  ABD: Denies nausea, vomiting or abdominal pain  : Denies dysuria  NEURO: Denies syncope, concussion, neuropathy  PSYCH: Denies any stress  EXTREM: Denies arthritic changes myalgia or arthralgia  SKIN: Denies any rash    Vital Signs  Vitals:    06/09/22 1022   BP: 120/84   BP Location: Left arm   Patient Position: Sitting   Cuff Size: Adult   Pulse: 80   Weight: 63 kg (139 lb)   Height: 162.6 cm (64\")   PainSc: 0-No pain     Body mass index is 23.86 kg/m².  BMI is within normal parameters. No other follow-up for BMI required.     Advance Care Planning   ACP discussion was held with the patient during this visit. Patient has an advance directive in EMR which is still valid.        Current Outpatient Medications:   •  cholecalciferol (VITAMIN D3) 1000 units tablet, Take 1,000 Units by mouth Daily., Disp: , Rfl:   •  lisinopril (PRINIVIL,ZESTRIL) 30 MG tablet, Take 1 tablet by mouth Daily., Disp: 90 tablet, Rfl: 3  •  Multiple Vitamins-Minerals (MULTIVITAMIN WITH MINERALS) tablet tablet, Take 1 tablet by mouth Daily., Disp: , Rfl:   •  Probiotic Product (PROBIOTIC-10) capsule, Take  by mouth Daily., Disp: , Rfl:   •  simvastatin (ZOCOR) 20 MG tablet, Take 1 tablet by mouth Every Evening., Disp: 90 tablet, Rfl: 3  •  temazepam (RESTORIL) 30 MG capsule, Take 1 capsule by mouth At Night As Needed for Sleep., Disp:  , Rfl:   •  Wheat Dextrin (Benefiber) powder, Take 1 each by mouth Daily., Disp: , Rfl:     Physical Exam     ACE III MINI         HEENT: Pupils equal reactive ENT clear, no facial asymmetry, pharynx is clear  NECK: No masses bruit or thyromegaly, very small left neck drainage gland nonpathologic  CHEST: Clear without rales wheezes or rhonchi  CARDIAC: Regular rhythm without gallop rub or click, blood pressure 134/72 mmHg, heart rate stable  ABD: Liver spleen normal, good bowel sounds, nontender  : " Deferred  NEURO: Intact  PSYCH: Normal  EXTREM: No significant arthritic changes, no edema, good circulation in both feet, good posterior tibial and dorsalis pedis pulses  SKIN: Clear     Results Review:    No results found for this or any previous visit (from the past 672 hour(s)).  Procedures    Medication Review: Medications reviewed and noted    Patient wellness counseling  Exercise: Continue exercise  Diet: Continue healthy cardiac diet  Screening: Fasting lab drawn earlier are pending  Social History     Socioeconomic History   • Marital status:    Tobacco Use   • Smoking status: Former Smoker     Packs/day: 0.25     Years: 1.00     Pack years: 0.25     Types: Cigarettes     Quit date: 1964     Years since quittin.4   • Smokeless tobacco: Never Used   • Tobacco comment: 1 CIGARETTES     Substance and Sexual Activity   • Alcohol use: Yes   • Drug use: No   • Sexual activity: Never     Birth control/protection: Other        Assessment/Plan:    Problem List Items Addressed This Visit        Cardiac and Vasculature    Hyperlipidemia    Overview     History of mixed hyperlipidemia on simvastatin 20 mg daily no history of myalgia arthralgia            Current Assessment & Plan     She will continue to take simvastatin as prescribed.  Fasting CMP and lipid are pending           Relevant Medications    simvastatin (ZOCOR) 20 MG tablet    Essential hypertension    Overview     History of essential hypertension with current blood pressure 122/80 on lisinopril 20 mg daily denies headache or cough blood pressure stable EKG is normal            Current Assessment & Plan     She will continue to take lisinopril as prescribed.  Essential hypertension with current blood pressure 120/84 continue lisinopril 30, fasting CMP is pending           Relevant Medications    lisinopril (PRINIVIL,ZESTRIL) 30 MG tablet       Infectious Diseases    Flu    Overview     Patient had flu infection and COVID 19 infection  approximately 2 and half weeks ago symptoms have resolved except for mild congestion drainage           Current Assessment & Plan     She is recovering nicely.              Other    COVID-19 virus infection - Primary    Overview     Recent COVID infection and flu infection 2 and half weeks ago has recovered except for occasional congestion           Current Assessment & Plan     She is recovering nicely.                  Patient Instructions   Fasting labs drawn earlier this morning are pending  Continue all current medications  Continue excellent cardiac healthy diet and exercise  Prescriptions reviewed and no refills needed  Return visit for annual wellness in 6 months       Plan of care reviewed with patient at the conclusion of today's visit. Education was provided regarding diagnosis, management, and any prescribed or recommended OTC medications.Patient verbalizes understanding of and agreement with management plan.         Wei Skelton MD      Note: Part of this note may be an electronic transcription/translation of spoken language to printed text using the Dragon Dictation system.    Transcribed from ambient dictation for Wei Skelton MD by Mara Omer.  06/09/22   13:34 EDT    Patient verbalized consent to the visit recording.  I have personally performed the services described in this document as transcribed by the above individual, and it is both accurate and complete.  Wei Skelton MD  6/9/2022  14:09 EDT

## 2022-06-10 LAB
25(OH)D3+25(OH)D2 SERPL-MCNC: 41.5 NG/ML (ref 30–100)
ALBUMIN SERPL-MCNC: 4.4 G/DL (ref 3.5–5.2)
ALBUMIN/GLOB SERPL: 1.7 G/DL
ALP SERPL-CCNC: 89 U/L (ref 39–117)
ALT SERPL-CCNC: 13 U/L (ref 1–33)
AST SERPL-CCNC: 18 U/L (ref 1–32)
BASOPHILS # BLD AUTO: 0.04 10*3/MM3 (ref 0–0.2)
BASOPHILS NFR BLD AUTO: 0.7 % (ref 0–1.5)
BILIRUB SERPL-MCNC: 0.5 MG/DL (ref 0–1.2)
BUN SERPL-MCNC: 13 MG/DL (ref 8–23)
BUN/CREAT SERPL: 14.4 (ref 7–25)
CALCIUM SERPL-MCNC: 9.5 MG/DL (ref 8.6–10.5)
CHLORIDE SERPL-SCNC: 104 MMOL/L (ref 98–107)
CHOLEST SERPL-MCNC: 191 MG/DL (ref 0–200)
CO2 SERPL-SCNC: 23.3 MMOL/L (ref 22–29)
CREAT SERPL-MCNC: 0.9 MG/DL (ref 0.57–1)
EGFRCR SERPLBLD CKD-EPI 2021: 65.6 ML/MIN/1.73
EOSINOPHIL # BLD AUTO: 0.14 10*3/MM3 (ref 0–0.4)
EOSINOPHIL NFR BLD AUTO: 2.6 % (ref 0.3–6.2)
ERYTHROCYTE [DISTWIDTH] IN BLOOD BY AUTOMATED COUNT: 12.4 % (ref 12.3–15.4)
GLOBULIN SER CALC-MCNC: 2.6 GM/DL
GLUCOSE SERPL-MCNC: 89 MG/DL (ref 65–99)
HCT VFR BLD AUTO: 40.2 % (ref 34–46.6)
HDLC SERPL-MCNC: 79 MG/DL (ref 40–60)
HGB BLD-MCNC: 13.2 G/DL (ref 12–15.9)
IMM GRANULOCYTES # BLD AUTO: 0.01 10*3/MM3 (ref 0–0.05)
IMM GRANULOCYTES NFR BLD AUTO: 0.2 % (ref 0–0.5)
LDLC SERPL CALC-MCNC: 101 MG/DL (ref 0–100)
LYMPHOCYTES # BLD AUTO: 2.17 10*3/MM3 (ref 0.7–3.1)
LYMPHOCYTES NFR BLD AUTO: 40.4 % (ref 19.6–45.3)
MCH RBC QN AUTO: 30.1 PG (ref 26.6–33)
MCHC RBC AUTO-ENTMCNC: 32.8 G/DL (ref 31.5–35.7)
MCV RBC AUTO: 91.6 FL (ref 79–97)
MONOCYTES # BLD AUTO: 0.54 10*3/MM3 (ref 0.1–0.9)
MONOCYTES NFR BLD AUTO: 10.1 % (ref 5–12)
NEUTROPHILS # BLD AUTO: 2.47 10*3/MM3 (ref 1.7–7)
NEUTROPHILS NFR BLD AUTO: 46 % (ref 42.7–76)
NRBC BLD AUTO-RTO: 0 /100 WBC (ref 0–0.2)
PLATELET # BLD AUTO: 279 10*3/MM3 (ref 140–450)
POTASSIUM SERPL-SCNC: 4.9 MMOL/L (ref 3.5–5.2)
PROT SERPL-MCNC: 7 G/DL (ref 6–8.5)
RBC # BLD AUTO: 4.39 10*6/MM3 (ref 3.77–5.28)
SODIUM SERPL-SCNC: 140 MMOL/L (ref 136–145)
TRIGL SERPL-MCNC: 59 MG/DL (ref 0–150)
TSH SERPL DL<=0.005 MIU/L-ACNC: 1.67 UIU/ML (ref 0.27–4.2)
VLDLC SERPL CALC-MCNC: 11 MG/DL (ref 5–40)
WBC # BLD AUTO: 5.37 10*3/MM3 (ref 3.4–10.8)

## 2022-07-18 RX ORDER — LISINOPRIL 30 MG/1
TABLET ORAL
Qty: 90 TABLET | Refills: 3 | Status: SHIPPED | OUTPATIENT
Start: 2022-07-18

## 2022-08-29 RX ORDER — SIMVASTATIN 20 MG
TABLET ORAL
Qty: 90 TABLET | Refills: 3 | Status: SHIPPED | OUTPATIENT
Start: 2022-08-29

## 2022-12-11 PROBLEM — Z00.00 MEDICARE ANNUAL WELLNESS VISIT, SUBSEQUENT: Status: ACTIVE | Noted: 2022-12-11

## 2022-12-12 ENCOUNTER — OFFICE VISIT (OUTPATIENT)
Dept: INTERNAL MEDICINE | Facility: CLINIC | Age: 77
End: 2022-12-12

## 2022-12-12 ENCOUNTER — LAB (OUTPATIENT)
Dept: LAB | Facility: HOSPITAL | Age: 77
End: 2022-12-12

## 2022-12-12 VITALS
HEIGHT: 64 IN | BODY MASS INDEX: 23.56 KG/M2 | WEIGHT: 138 LBS | SYSTOLIC BLOOD PRESSURE: 126 MMHG | DIASTOLIC BLOOD PRESSURE: 76 MMHG | HEART RATE: 80 BPM

## 2022-12-12 DIAGNOSIS — E78.2 MIXED HYPERLIPIDEMIA: ICD-10-CM

## 2022-12-12 DIAGNOSIS — G47.00 INSOMNIA, UNSPECIFIED TYPE: ICD-10-CM

## 2022-12-12 DIAGNOSIS — Z00.00 MEDICARE ANNUAL WELLNESS VISIT, SUBSEQUENT: Primary | ICD-10-CM

## 2022-12-12 DIAGNOSIS — M15.9 PRIMARY OSTEOARTHRITIS INVOLVING MULTIPLE JOINTS: ICD-10-CM

## 2022-12-12 DIAGNOSIS — K58.9 IRRITABLE BOWEL SYNDROME, UNSPECIFIED TYPE: ICD-10-CM

## 2022-12-12 DIAGNOSIS — I10 ESSENTIAL HYPERTENSION: ICD-10-CM

## 2022-12-12 DIAGNOSIS — Z00.00 MEDICARE ANNUAL WELLNESS VISIT, SUBSEQUENT: ICD-10-CM

## 2022-12-12 PROCEDURE — 1159F MED LIST DOCD IN RCRD: CPT | Performed by: INTERNAL MEDICINE

## 2022-12-12 PROCEDURE — 99213 OFFICE O/P EST LOW 20 MIN: CPT | Performed by: INTERNAL MEDICINE

## 2022-12-12 PROCEDURE — 96160 PT-FOCUSED HLTH RISK ASSMT: CPT | Performed by: INTERNAL MEDICINE

## 2022-12-12 PROCEDURE — 93000 ELECTROCARDIOGRAM COMPLETE: CPT | Performed by: INTERNAL MEDICINE

## 2022-12-12 PROCEDURE — G0439 PPPS, SUBSEQ VISIT: HCPCS | Performed by: INTERNAL MEDICINE

## 2022-12-12 PROCEDURE — 1170F FXNL STATUS ASSESSED: CPT | Performed by: INTERNAL MEDICINE

## 2022-12-12 NOTE — PATIENT INSTRUCTIONS
Fasting lab of CBC CMP lipid and TSH pending  EKG is normal and discussed  Continue all current therapy  Continue exercise and ADL  Continue healthy cardiac diet  Return visit in 6 months or as needed

## 2022-12-12 NOTE — ASSESSMENT & PLAN NOTE
Mixed hyperlipidemia on simvastatin 20 mg daily denies myalgia arthralgia continue therapy fasting CMP and lipid are pending

## 2022-12-12 NOTE — ASSESSMENT & PLAN NOTE
Degenerative arthritis disease with mild back and bilateral knee discomfort Tylenol and as needed NSAID therapy is beneficial continue therapy as needed

## 2022-12-12 NOTE — PROGRESS NOTES
QUICK REFERENCE INFORMATION:  The ABCs of the Annual Wellness Visit    Subsequent Medicare Wellness Visit    HEALTH RISK ASSESSMENT    1945    Recent Hospitalizations:  No hospitalization(s) within the last year..        Current Medical Providers:  Patient Care Team:  Wei Skelton MD as PCP - General  Wei Skelton MD as PCP - Family Medicine  Reginald Dove MD as Consulting Physician (Ophthalmology)  Sammy Delvalle MD as Consulting Physician (Gastroenterology)  Becca Rico MD as Consulting Physician (Dermatology)        Smoking Status:  Social History     Tobacco Use   Smoking Status Former   • Packs/day: 0.25   • Years: 1.00   • Pack years: 0.25   • Types: Cigarettes   • Quit date: 1964   • Years since quittin.9   Smokeless Tobacco Never   Tobacco Comments    1 CIGARETTES         Alcohol Consumption:  Social History     Substance and Sexual Activity   Alcohol Use Yes       Depression Screen:   PHQ-2/PHQ-9 Depression Screening 2022   Retired PHQ-9 Total Score -   Retired Total Score -   Little Interest or Pleasure in Doing Things 0-->not at all   Feeling Down, Depressed or Hopeless 0-->not at all   PHQ-9: Brief Depression Severity Measure Score 0       Health Habits and Functional and Cognitive Screening:  Functional & Cognitive Status 2022   Do you have difficulty preparing food and eating? No   Do you have difficulty bathing yourself, getting dressed or grooming yourself? No   Do you have difficulty using the toilet? No   Do you have difficulty moving around from place to place? No   Do you have trouble with steps or getting out of a bed or a chair? No   Current Diet Well Balanced Diet   Dental Exam Up to date   Eye Exam Up to date   Exercise (times per week) 4 times per week   Current Exercises Include Walking   Current Exercise Activities Include -   Do you need help using the phone?  No   Are you deaf or do you have serious difficulty hearing?  No   Do you need help with  transportation? No   Do you need help shopping? No   Do you need help preparing meals?  No   Do you need help with housework?  No   Do you need help with laundry? No   Do you need help taking your medications? No   Do you need help managing money? No   Do you ever drive or ride in a car without wearing a seat belt? No   Have you felt unusual stress, anger or loneliness in the last month? No   Who do you live with? Alone   If you need help, do you have trouble finding someone available to you? No   Have you been bothered in the last four weeks by sexual problems? No   Do you have difficulty concentrating, remembering or making decisions? No       [facility] Risk Screen:  [facility], and patient is at LOW risk for falls.Assessment completed on:12/12/2022    ACE III MINI        Does the patient have evidence of cognitive impairment? No    Aspirin use counseling: Does not need ASA (and currently is not on it)    Recent Lab Results:  CMP:  Lab Results   Component Value Date    BUN 13 06/09/2022    CREATININE 0.90 06/09/2022    EGFRIFNONA 75 11/23/2021    EGFRIFAFRI 91 11/23/2021    BCR 14.4 06/09/2022     06/09/2022    K 4.9 06/09/2022    CO2 23.3 06/09/2022    CALCIUM 9.5 06/09/2022    PROTENTOTREF 7.0 06/09/2022    ALBUMIN 4.40 06/09/2022    LABGLOBREF 2.6 06/09/2022    LABIL2 1.7 06/09/2022    BILITOT 0.5 06/09/2022    ALKPHOS 89 06/09/2022    AST 18 06/09/2022    ALT 13 06/09/2022     HbA1c:  No results found for: HGBA1C  Microalbumin:  Lab Results   Component Value Date    MICROALBUR 12.1 11/11/2020     Lipid Panel  Lab Results   Component Value Date    TRIG 59 06/09/2022    HDL 79 (H) 06/09/2022     (H) 06/09/2022    AST 18 06/09/2022    ALT 13 06/09/2022       Visual Acuity:  No results found.    Age-appropriate Screening Schedule:  Refer to the list below for future screening recommendations based on patient's age, sex and/or medical conditions. Orders for these recommended  tests are listed in the plan section. The patient has been provided with a written plan.    Health Maintenance   Topic Date Due   • ZOSTER VACCINE (2 of 3) 10/10/2016   • DXA SCAN  05/13/2023   • LIPID PANEL  06/09/2023   • MAMMOGRAM  05/18/2024   • TDAP/TD VACCINES (2 - Td or Tdap) 04/20/2025   • INFLUENZA VACCINE  Completed        Subjective   History of Present Illness    Lucia Mc is a 77 y.o. female who presents for a subsequent Medicare annual wellness visit and physical examination with essential hypertension, mixed hyperlipidemia, irritable bowel syndrome, insomnia, and degenerative arthritis disease.    The patient states that she is doing well. She states that she was running around doing errands this morning which may be the reason that her systolic blood pressure was measured to be high in the clinic earlier. She confirms that she exercises by walking 3 times weekly, and does a lot of additional walking downtown. She confirms that she takes lisinopril and denies cough associated with the medication. She denies headache, shortness of breath, chest pain, chest pressure, and palpitations.    She denies upset stomach except when eating raw onions or spicy food. She confirms that she takes Benefiber, which was recommended by Dr. Macedo, and has improved her symptoms. She denies constipation or diarrhea. She reports frequent urination with urgency, but denies wearing pads or dysuria.     She reports that she had her influenza vaccination. She confirms that she takes simvastatin, vitamin D and probiotic. She reports that she has discontinued temazepam. She denies changes in allergies, congestion, cough, eyesight changes, hearing changes, dysphagia, coughing, wheezing, myalgia, or arthralgia.     CHRONIC CONDITIONS    The following portions of the patient's history were reviewed and updated as appropriate: allergies, current medications, past family history, past medical history, past social history and  past surgical history.    Outpatient Medications Prior to Visit   Medication Sig Dispense Refill   • cholecalciferol (VITAMIN D3) 1000 units tablet Take 1,000 Units by mouth Daily.     • lisinopril (PRINIVIL,ZESTRIL) 30 MG tablet TAKE 1 TABLET EVERY DAY 90 tablet 3   • Multiple Vitamins-Minerals (MULTIVITAMIN WITH MINERALS) tablet tablet Take 1 tablet by mouth Daily.     • Probiotic Product (PROBIOTIC-10) capsule Take  by mouth Daily.     • simvastatin (ZOCOR) 20 MG tablet TAKE 1 TABLET EVERY EVENING 90 tablet 3   • Wheat Dextrin (Benefiber) powder Take 1 each by mouth Daily.     • temazepam (RESTORIL) 30 MG capsule Take 1 capsule by mouth At Night As Needed for Sleep.       No facility-administered medications prior to visit.       Patient Active Problem List   Diagnosis   • Insomnia, unspecified   • Post-menopausal   • Screening mammogram, encounter for   • Osteoporosis   • Hyperlipidemia   • Primary osteoarthritis involving multiple joints   • Early cataract   • Essential hypertension   • Vitamin D deficiency   • Left ear impacted cerumen   • Osteopenia   • Medicare annual wellness visit, subsequent   • Polyp of colon   • Alcohol use   • Kyphosis, acquired   • Histoplasmosis   • Lower abdominal pain   • Irritable bowel syndrome   • Cataract of left eye   • Acute midline thoracic back pain   • Stress   • Medicare annual wellness visit, subsequent   • COVID-19 virus infection   • Flu   • Medicare annual wellness visit, subsequent       Advance Care Planning:  ACP discussion was held with the patient during this visit. Patient has an advance directive in EMR which is still valid.     Identification of Risk Factors:  Risk factors include: Cardiovascular risk.    Review of Systems    HEENT: Denies any hearing changes, eyesight changes, headache, or dizziness.  NECK: Denies any pain, stiffness, swelling, or dysphagia.  CHEST: Denies cough, wheeze, or recent lung infections.  CARDIAC: Denies chest pain, pressure, or  palpitations.  Repeated blood pressure is 126/76  ABD: Denies nausea, vomiting, or abdominal pain.  : Denies dysuria. Increased frequency with urinary urgency.  NEURO: Denies syncope, concussion, or neuropathy.  PSYCH: Denies any stress.  EXTREM: Denies arthritic changes, myalgia, or arthralgia.  SKIN: Denies any rash.    Compared to one year ago, the patient feels her physical health is the same.  Compared to one year ago, the patient feels her mental health is the same.    Objective     Physical Exam     HEENT: Pupils equal reactive. Ears, nose, and throat are clear. No facial asymmetry and pharynx is clear.  NECK: No masses, bruit, or thyromegaly.  CHEST: Clear without rales, wheezes, or rhonchi.  CARDIAC: Regular rhythm without gallop, rub, or click. Blood pressure and heart rate stable.  Repeated blood pressure is 126/76  ABD: Liver and spleen are normal, good bowel sounds, nontender.  : Deferred.  NEURO: Intact.  PSYCH: Normal.  EXTREM: No significant arthritic changes, no edema.  SKIN: Clear        ECG 12 Lead    Date/Time: 12/12/2022 5:49 PM  Performed by: Wei Skelton MD  Authorized by: Wei Skelton MD   Comparison: compared with previous ECG from 11/23/2021  Similar to previous ECG  Comparison to previous ECG: Current EKG is sinus rhythm normal EKG similar to November 23, 2021  Rhythm: sinus rhythm  Rate: normal  Conduction: conduction normal  ST Segments: ST segments normal  T Waves: T waves normal  QRS axis: normal  Other: no other findings    Clinical impression: normal ECG  Comments: Current EKG is sinus rhythm normal EKG similar to EKG of November 23, 2021         Patient Wellness Counseling:   Plan of care reviewed with patient at the conclusion of today's visit. Education was provided in regards to diagnosis, diet and exercise, cervical cancer screening, self breast exams, breast cancer screening, and the importance of yearly mammograms.   Nutrition, family planning/contraception, physical  "activity, healthy weight,ways to reduce stress, adequate sleep, injury prevention, misuse of tobacco, alcohol and drugs, sexual behavior and STD's, dental health, mental health, and immunizations.    Management and any prescribed or recommended OTC medications.  Patient verbalizes understanding of and agreement with management plan.      Electrocardiogram is normal.    Vitals:    12/12/22 0956 12/12/22 1010   BP: 150/88 126/76   BP Location: Right arm    Patient Position: Sitting    Cuff Size: Adult    Pulse: 80    Weight: 62.6 kg (138 lb)    Height: 162.6 cm (64\")    PainSc: 0-No pain        BMI is within normal parameters. No other follow-up for BMI required.      Assessment & Plan   Problem List Items Addressed This Visit        Cardiac and Vasculature    Hyperlipidemia    Overview     History of mixed hyperlipidemia on simvastatin 20 mg daily no history of myalgia arthralgia          Current Assessment & Plan       Mixed hyperlipidemia on simvastatin 20 mg daily denies myalgia arthralgia continue therapy fasting CMP and lipid are pending         Relevant Medications    simvastatin (ZOCOR) 20 MG tablet    Other Relevant Orders    CBC & Differential    Comprehensive Metabolic Panel    Lipid Panel    TSH    Essential hypertension    Overview     History of essential hypertension with current blood pressure 122/80 on lisinopril 20 mg daily denies headache or cough blood pressure stable EKG is normal          Current Assessment & Plan     Well-controlled. Blood pressure in the examination room was measured to be 122/70 mmHg while sitting, and 124/72 mmHg while supine. Electrocardiogram is normal.  CMP is pending, continue therapy of lisinopril 30 mg daily         Relevant Medications    lisinopril (PRINIVIL,ZESTRIL) 30 MG tablet    Other Relevant Orders    CBC & Differential    Comprehensive Metabolic Panel    Lipid Panel    TSH    ECG 12 Lead       Gastrointestinal Abdominal     Irritable bowel syndrome    Overview "     Patient had a recent CT scan of abdomen pelvis, colonoscopy, EGD, and GYN referral no significant pathology was found patient is improved she is on probiotic she is on fiber she does feel better.         Current Assessment & Plan     Patient has mild IBS improved on probiotic and Benefiber and currently stable continue therapy         Relevant Orders    CBC & Differential    Comprehensive Metabolic Panel    Lipid Panel    TSH       Health Encounters    Medicare annual wellness visit, subsequent - Primary    Overview     77-year-old female presents for Medicare annual wellness visit, physical examination on December 12, 2022         Relevant Orders    CBC & Differential    Comprehensive Metabolic Panel    Lipid Panel    TSH       Musculoskeletal and Injuries    Primary osteoarthritis involving multiple joints    Overview     Multi-joint knowledgeable arthritis disease with back pain and and bilateral knee discomfort overall doing well PRN NSAID therapy or Tylenol are beneficial         Current Assessment & Plan     Degenerative arthritis disease with mild back and bilateral knee discomfort Tylenol and as needed NSAID therapy is beneficial continue therapy as needed         Relevant Orders    CBC & Differential    Comprehensive Metabolic Panel    Lipid Panel    TSH       Sleep    Insomnia, unspecified    Overview     Mild insomnia on temazepam 30 mg at at bedtime         Current Assessment & Plan     Patient takes occasional temazepam 30 mg with good sleep continue as needed therapy         Relevant Medications    temazepam (RESTORIL) 30 MG capsule    Other Relevant Orders    CBC & Differential    Comprehensive Metabolic Panel    Lipid Panel    TSH     Patient Self-Management and Personalized Health Advice  The patient has been provided with information about: prevention of cardiac or vascular disease and preventive services including:   · Annual Wellness Visit (AWV).    Outpatient Encounter Medications as of  12/12/2022   Medication Sig Dispense Refill   • cholecalciferol (VITAMIN D3) 1000 units tablet Take 1,000 Units by mouth Daily.     • lisinopril (PRINIVIL,ZESTRIL) 30 MG tablet TAKE 1 TABLET EVERY DAY 90 tablet 3   • Multiple Vitamins-Minerals (MULTIVITAMIN WITH MINERALS) tablet tablet Take 1 tablet by mouth Daily.     • Probiotic Product (PROBIOTIC-10) capsule Take  by mouth Daily.     • simvastatin (ZOCOR) 20 MG tablet TAKE 1 TABLET EVERY EVENING 90 tablet 3   • Wheat Dextrin (Benefiber) powder Take 1 each by mouth Daily.     • temazepam (RESTORIL) 30 MG capsule Take 1 capsule by mouth At Night As Needed for Sleep.       No facility-administered encounter medications on file as of 12/12/2022.         Reviewed use of high risk medication in the elderly: yes  Reviewed for potential of harmful drug interactions in the elderly: yes    Follow Up:  Return in about 6 months (around 6/12/2023) for Recheck, labs today.     Patient Instructions   Fasting lab of CBC CMP lipid and TSH pending  EKG is normal and discussed  Continue all current therapy  Continue exercise and ADL  Continue healthy cardiac diet  Return visit in 6 months or as needed      An After Visit Summary and PPPS with all of these plans were given to the patient.          Transcribed from ambient dictation for Wei Skelton MD by Adryan Mcdonald.  12/12/22   14:41 EST    Patient or patient representative verbalized consent to the visit recording.  I have personally performed the services described in this document as transcribed by the above individual, and it is both accurate and complete.  Wei Skelton MD  12/12/2022  17:51 EST

## 2022-12-12 NOTE — ASSESSMENT & PLAN NOTE
Well-controlled. Blood pressure in the examination room was measured to be 122/70 mmHg while sitting, and 124/72 mmHg while supine. Electrocardiogram is normal.  CMP is pending, continue therapy of lisinopril 30 mg daily

## 2022-12-13 LAB
ALBUMIN SERPL-MCNC: 4.9 G/DL (ref 3.5–5.2)
ALBUMIN/GLOB SERPL: 2.3 G/DL
ALP SERPL-CCNC: 106 U/L (ref 39–117)
ALT SERPL-CCNC: 11 U/L (ref 1–33)
AST SERPL-CCNC: 15 U/L (ref 1–32)
BASOPHILS # BLD AUTO: 0.03 10*3/MM3 (ref 0–0.2)
BASOPHILS NFR BLD AUTO: 0.6 % (ref 0–1.5)
BILIRUB SERPL-MCNC: 0.4 MG/DL (ref 0–1.2)
BUN SERPL-MCNC: 9 MG/DL (ref 8–23)
BUN/CREAT SERPL: 11.3 (ref 7–25)
CALCIUM SERPL-MCNC: 9.6 MG/DL (ref 8.6–10.5)
CHLORIDE SERPL-SCNC: 105 MMOL/L (ref 98–107)
CHOLEST SERPL-MCNC: 242 MG/DL (ref 0–200)
CO2 SERPL-SCNC: 27.6 MMOL/L (ref 22–29)
CREAT SERPL-MCNC: 0.8 MG/DL (ref 0.57–1)
EGFRCR SERPLBLD CKD-EPI 2021: 75.5 ML/MIN/1.73
EOSINOPHIL # BLD AUTO: 0.09 10*3/MM3 (ref 0–0.4)
EOSINOPHIL NFR BLD AUTO: 1.8 % (ref 0.3–6.2)
ERYTHROCYTE [DISTWIDTH] IN BLOOD BY AUTOMATED COUNT: 12.3 % (ref 12.3–15.4)
GLOBULIN SER CALC-MCNC: 2.1 GM/DL
GLUCOSE SERPL-MCNC: 92 MG/DL (ref 65–99)
HCT VFR BLD AUTO: 38.9 % (ref 34–46.6)
HDLC SERPL-MCNC: 86 MG/DL (ref 40–60)
HGB BLD-MCNC: 13.5 G/DL (ref 12–15.9)
IMM GRANULOCYTES # BLD AUTO: 0.01 10*3/MM3 (ref 0–0.05)
IMM GRANULOCYTES NFR BLD AUTO: 0.2 % (ref 0–0.5)
LDLC SERPL CALC-MCNC: 134 MG/DL (ref 0–100)
LYMPHOCYTES # BLD AUTO: 1.53 10*3/MM3 (ref 0.7–3.1)
LYMPHOCYTES NFR BLD AUTO: 31 % (ref 19.6–45.3)
MCH RBC QN AUTO: 31.9 PG (ref 26.6–33)
MCHC RBC AUTO-ENTMCNC: 34.7 G/DL (ref 31.5–35.7)
MCV RBC AUTO: 92 FL (ref 79–97)
MONOCYTES # BLD AUTO: 0.46 10*3/MM3 (ref 0.1–0.9)
MONOCYTES NFR BLD AUTO: 9.3 % (ref 5–12)
NEUTROPHILS # BLD AUTO: 2.81 10*3/MM3 (ref 1.7–7)
NEUTROPHILS NFR BLD AUTO: 57.1 % (ref 42.7–76)
NRBC BLD AUTO-RTO: 0 /100 WBC (ref 0–0.2)
PLATELET # BLD AUTO: 218 10*3/MM3 (ref 140–450)
POTASSIUM SERPL-SCNC: 4.4 MMOL/L (ref 3.5–5.2)
PROT SERPL-MCNC: 7 G/DL (ref 6–8.5)
RBC # BLD AUTO: 4.23 10*6/MM3 (ref 3.77–5.28)
SODIUM SERPL-SCNC: 144 MMOL/L (ref 136–145)
TRIGL SERPL-MCNC: 128 MG/DL (ref 0–150)
TSH SERPL DL<=0.005 MIU/L-ACNC: 1.58 UIU/ML (ref 0.27–4.2)
VLDLC SERPL CALC-MCNC: 22 MG/DL (ref 5–40)
WBC # BLD AUTO: 4.93 10*3/MM3 (ref 3.4–10.8)

## 2023-01-04 ENCOUNTER — TRANSCRIBE ORDERS (OUTPATIENT)
Dept: ADMINISTRATIVE | Facility: HOSPITAL | Age: 78
End: 2023-01-04
Payer: MEDICARE

## 2023-01-04 DIAGNOSIS — Z12.31 VISIT FOR SCREENING MAMMOGRAM: Primary | ICD-10-CM

## 2023-05-19 ENCOUNTER — HOSPITAL ENCOUNTER (OUTPATIENT)
Dept: MAMMOGRAPHY | Facility: HOSPITAL | Age: 78
Discharge: HOME | End: 2023-05-19
Admitting: INTERNAL MEDICINE
Payer: MEDICARE

## 2023-05-19 DIAGNOSIS — Z12.31 VISIT FOR SCREENING MAMMOGRAM: ICD-10-CM

## 2023-05-19 PROCEDURE — 77063 BREAST TOMOSYNTHESIS BI: CPT

## 2023-05-19 PROCEDURE — 77067 SCR MAMMO BI INCL CAD: CPT

## 2023-06-19 ENCOUNTER — OFFICE VISIT (OUTPATIENT)
Dept: INTERNAL MEDICINE | Facility: CLINIC | Age: 78
End: 2023-06-19
Payer: MEDICARE

## 2023-06-19 VITALS
WEIGHT: 140 LBS | BODY MASS INDEX: 23.9 KG/M2 | SYSTOLIC BLOOD PRESSURE: 154 MMHG | HEIGHT: 64 IN | DIASTOLIC BLOOD PRESSURE: 80 MMHG | HEART RATE: 68 BPM

## 2023-06-19 DIAGNOSIS — E78.2 MIXED HYPERLIPIDEMIA: ICD-10-CM

## 2023-06-19 DIAGNOSIS — I10 ESSENTIAL HYPERTENSION: Primary | ICD-10-CM

## 2023-06-19 PROCEDURE — 1160F RVW MEDS BY RX/DR IN RCRD: CPT | Performed by: INTERNAL MEDICINE

## 2023-06-19 PROCEDURE — 1159F MED LIST DOCD IN RCRD: CPT | Performed by: INTERNAL MEDICINE

## 2023-06-19 PROCEDURE — 99213 OFFICE O/P EST LOW 20 MIN: CPT | Performed by: INTERNAL MEDICINE

## 2023-06-19 PROCEDURE — 3079F DIAST BP 80-89 MM HG: CPT | Performed by: INTERNAL MEDICINE

## 2023-06-19 PROCEDURE — 3077F SYST BP >= 140 MM HG: CPT | Performed by: INTERNAL MEDICINE

## 2023-06-19 NOTE — PROGRESS NOTES
[facility]     Lucia Mc  1945   [id number]      Patient Care Team:  Wei Skelton MD as PCP - General  Wei Skelton MD as PCP - Family Medicine  Reginald Dove MD as Consulting Physician (Ophthalmology)  Sammy Delvalle MD as Consulting Physician (Gastroenterology)  Becca Rico MD as Consulting Physician (Dermatology)    Chief Complaint::   Chief Complaint   Patient presents with    Hyperlipidemia     6 mo follow up    Hypertension            HPI    The patient is a 78-year-old female who presents for an office visit for essential hypertension and mixed hyperlipidemia.    The patient is doing well overall. She would like to lose approximately 10 pounds; however, she does not feel it is going to happen. She does have a vacation planned for 07/2023.    The patient denies any headache, dizziness, lightheadedness, vision, or hearing is good. She has minimal allergy issues. She denies coughing, wheezing, or shortness of breath. The patient denies any chest pain or heart skipping. She does not get short of breath when she is physically walking around or walking on the beach. The patient denies any abdominal issues. She denies any hip, knee, ankle trouble, or sprains. She denies checking her blood pressure anywhere but in the office. She does her mammogram every year. The patient sees a dermatologist. She denies needing refills on any medications today.     The patient works at [employer].         Patient Active Problem List   Diagnosis    Insomnia, unspecified    Post-menopausal    Screening mammogram, encounter for    Osteoporosis    Hyperlipidemia    Primary osteoarthritis involving multiple joints    Early cataract    Essential hypertension    Vitamin D deficiency    Left ear impacted cerumen    Osteopenia    Medicare annual wellness visit, subsequent    Polyp of colon    Alcohol use    Kyphosis, acquired    Histoplasmosis    Lower abdominal pain    Irritable bowel syndrome     Cataract of left eye    Acute midline thoracic back pain    Stress    Medicare annual wellness visit, subsequent    COVID-19 virus infection    Flu    Medicare annual wellness visit, subsequent        Past Medical History:   Diagnosis Date    Arthritis     Hyperlipidemia     Hypertension     Ovarian cyst        Past Surgical History:   Procedure Laterality Date    APPENDECTOMY       SECTION      COLONOSCOPY  2009    OVARIAN CYST SURGERY      TUBAL ABDOMINAL LIGATION Bilateral        Family History   Problem Relation Age of Onset    Breast cancer Maternal Aunt 70    Pancreatic cancer Other     COPD Other     Diabetes Other     Ovarian cancer Neg Hx     Uterine cancer Neg Hx     Endometrial cancer Neg Hx     Colon cancer Neg Hx        Social History     Socioeconomic History    Marital status:    Tobacco Use    Smoking status: Former     Packs/day: 0.25     Years: 1.00     Pack years: 0.25     Types: Cigarettes     Quit date: 1964     Years since quittin.5    Smokeless tobacco: Never    Tobacco comments:     1 CIGARETTES     Substance and Sexual Activity    Alcohol use: Yes     Alcohol/week: 7.0 standard drinks     Types: 7 Glasses of wine per week    Drug use: No    Sexual activity: Never     Birth control/protection: Other       No Known Allergies    Review of Systems   HENT:  Negative for hearing loss.    Eyes: Negative.    Respiratory:  Negative for cough and wheezing.         Denies recent lung infections.    Cardiovascular:  Negative for chest pain and palpitations.        Denies chest pressure.    Gastrointestinal:  Negative for abdominal pain, nausea and vomiting.   Genitourinary:  Negative for dysuria.   Musculoskeletal:  Negative for arthralgias and myalgias.        NECK: Denies any pain, stiffness, swelling or dysphagia   Skin:  Negative for rash.   Neurological:  Negative for dizziness, syncope and headache.        Denies concussion or neuropathy.    Psychiatric/Behavioral:  "Negative.       Vital Signs  Vitals:    06/19/23 1545   BP: 154/80   BP Location: Right arm   Patient Position: Sitting   Cuff Size: Adult   Pulse: 68   Weight: 63.5 kg (140 lb)   Height: 162.6 cm (64\")   PainSc: 0-No pain     Body mass index is 24.03 kg/m².  BMI is within normal parameters. No other follow-up for BMI required.     Advance Care Planning   ACP discussion was held with the patient during this visit. Patient has an advance directive in EMR which is still valid.        Current Outpatient Medications:     cholecalciferol (VITAMIN D3) 1000 units tablet, Take 1 tablet by mouth Daily., Disp: , Rfl:     lisinopril (PRINIVIL,ZESTRIL) 30 MG tablet, TAKE 1 TABLET EVERY DAY, Disp: 90 tablet, Rfl: 3    Multiple Vitamins-Minerals (MULTIVITAMIN WITH MINERALS) tablet tablet, Take 1 tablet by mouth Daily., Disp: , Rfl:     Probiotic Product (PROBIOTIC-10) capsule, Take  by mouth Daily., Disp: , Rfl:     simvastatin (ZOCOR) 20 MG tablet, TAKE 1 TABLET EVERY EVENING, Disp: 90 tablet, Rfl: 3    temazepam (RESTORIL) 30 MG capsule, Take 1 capsule by mouth At Night As Needed for Sleep., Disp:  , Rfl:     Wheat Dextrin (Benefiber) powder, Take 1 each by mouth Daily., Disp: , Rfl:     Physical Exam  HENT:      Mouth/Throat:      Pharynx: Oropharynx is clear.   Eyes:      Extraocular Movements: Extraocular movements intact.      Pupils: Pupils are equal, round, and reactive to light.   Neck:      Thyroid: No thyroid mass or thyromegaly.      Vascular: No carotid bruit.   Cardiovascular:      Rate and Rhythm: Normal rate and regular rhythm.      Pulses: Normal pulses.      Heart sounds: Normal heart sounds. No murmur heard.    No friction rub. No gallop.      Comments: No click, blood pressure 142/80 mmHg   Pulmonary:      Effort: Pulmonary effort is normal.      Breath sounds: Normal breath sounds. No wheezing, rhonchi or rales.   Abdominal:      General: Bowel sounds are normal.      Tenderness: There is no abdominal " tenderness.      Comments: Liver spleen normal.    Musculoskeletal:      Right lower leg: No edema.      Left lower leg: No edema.      Comments: No significant arthritic changes.    Skin:     General: Skin is warm and dry.   Neurological:      Cranial Nerves: Cranial nerves 2-12 are intact.   Psychiatric:         Mood and Affect: Mood normal.         Behavior: Behavior normal.        ACE III MINI            Results Review:    No results found for this or any previous visit (from the past 672 hour(s)).  Procedures    Medication Review: Medications reviewed and noted    Patient wellness counseling  Exercise: Continue ADL and activity exercising  Diet: Continue healthy cardiac diet  Screening:  Social History     Socioeconomic History    Marital status:    Tobacco Use    Smoking status: Former     Packs/day: 0.25     Years: 1.00     Pack years: 0.25     Types: Cigarettes     Quit date: 1964     Years since quittin.5    Smokeless tobacco: Never    Tobacco comments:     1 CIGARETTES     Substance and Sexual Activity    Alcohol use: Yes     Alcohol/week: 7.0 standard drinks     Types: 7 Glasses of wine per week    Drug use: No    Sexual activity: Never     Birth control/protection: Other        Assessment/Plan:    Problem List Items Addressed This Visit          Cardiac and Vasculature    Hyperlipidemia    Overview     History of mixed hyperlipidemia on simvastatin 20 mg daily no history of myalgia arthralgia          Current Assessment & Plan       Mixed hyperlipidemia on simvastatin 20 mg daily denies myalgia or arthralgia continue therapy         Relevant Medications    simvastatin (ZOCOR) 20 MG tablet    Essential hypertension - Primary    Overview     History of essential hypertension with current blood pressure 122/80 on lisinopril 20 mg daily denies headache or cough blood pressure stable EKG is normal          Current Assessment & Plan     Essential hypertension with current blood pressure 154/80  and heart rate of 68 repeated blood pressure today 142/80 on lisinopril 30 mg daily continue therapy         Relevant Medications    lisinopril (PRINIVIL,ZESTRIL) 30 MG tablet        The patient will follow up on 12/13/2023 as she already has made this appointment.       Patient Instructions   Continue current medications  Continue walking exercise activity ADL  Continue healthy cardiac diet  Return for annual wellness visit December 13th 2023  No labs on this diet     Plan of care reviewed with patient at the conclusion of today's visit. Education was provided regarding diagnosis, management, and any prescribed or recommended OTC medications.Patient verbalizes understanding of and agreement with management plan.         Wei Skelton MD      Note: Part of this note may be an electronic transcription/translation of spoken language to printed text using the Dragon Dictation system.      Transcribed from ambient dictation for Wei Skelton MD by Alaina Santillan.  06/19/23   18:53 EDT    Patient or patient representative verbalized consent to the visit recording.  I have personally performed the services described in this document as transcribed by the above individual, and it is both accurate and complete.

## 2023-06-19 NOTE — ASSESSMENT & PLAN NOTE
Essential hypertension with current blood pressure 154/80 and heart rate of 68 repeated blood pressure today 142/80 on lisinopril 30 mg daily continue therapy

## 2023-06-19 NOTE — PATIENT INSTRUCTIONS
Continue current medications  Continue walking exercise activity ADL  Continue healthy cardiac diet  Return for annual wellness visit December 13th 2023  No labs on this diet

## 2023-10-17 RX ORDER — SIMVASTATIN 20 MG
TABLET ORAL
Qty: 90 TABLET | Refills: 3 | Status: SHIPPED | OUTPATIENT
Start: 2023-10-17

## 2024-01-21 PROBLEM — Z00.00 MEDICARE ANNUAL WELLNESS VISIT, SUBSEQUENT: Status: ACTIVE | Noted: 2024-01-21

## 2024-01-22 ENCOUNTER — TELEPHONE (OUTPATIENT)
Dept: INTERNAL MEDICINE | Facility: CLINIC | Age: 79
End: 2024-01-22
Payer: MEDICARE

## 2024-01-22 DIAGNOSIS — E78.2 MIXED HYPERLIPIDEMIA: ICD-10-CM

## 2024-01-22 DIAGNOSIS — I10 ESSENTIAL HYPERTENSION: Primary | ICD-10-CM

## 2024-01-22 NOTE — TELEPHONE ENCOUNTER
"Caller: Lucia Mc \"Ifeoma\"    Relationship: Self    Best call back number: [phone]     What orders are you requesting (i.e. lab or imaging): BLOOD WORK     In what timeframe would the patient need to come in: BEFORE 1/24/24     Where will you receive your lab/imaging services: [facility]     Additional notes: PATIENT HAS A MEDICARE WELLNESS VISIT ON 1/24/24 AND SHE WOULD LIKE TO GET THE BLOOD WORK DONE BEFORE THAT APPOINTMENT.         "

## 2024-01-23 ENCOUNTER — LAB (OUTPATIENT)
Dept: LAB | Facility: HOSPITAL | Age: 79
End: 2024-01-23
Payer: MEDICARE

## 2024-01-23 DIAGNOSIS — I10 ESSENTIAL HYPERTENSION: ICD-10-CM

## 2024-01-23 DIAGNOSIS — E78.2 MIXED HYPERLIPIDEMIA: ICD-10-CM

## 2024-01-23 LAB
ALBUMIN SERPL-MCNC: 4.9 G/DL (ref 3.5–5.2)
ALBUMIN/GLOB SERPL: 2.2 G/DL
ALP SERPL-CCNC: 106 U/L (ref 39–117)
ALT SERPL W P-5'-P-CCNC: 14 U/L (ref 1–33)
ANION GAP SERPL CALCULATED.3IONS-SCNC: 12 MMOL/L (ref 5–15)
AST SERPL-CCNC: 18 U/L (ref 1–32)
BASOPHILS # BLD AUTO: 0.06 10*3/MM3 (ref 0–0.2)
BASOPHILS NFR BLD AUTO: 1.1 % (ref 0–1.5)
BILIRUB SERPL-MCNC: 0.5 MG/DL (ref 0–1.2)
BUN SERPL-MCNC: 15 MG/DL (ref 8–23)
BUN/CREAT SERPL: 17.2 (ref 7–25)
CALCIUM SPEC-SCNC: 10.1 MG/DL (ref 8.6–10.5)
CHLORIDE SERPL-SCNC: 103 MMOL/L (ref 98–107)
CHOLEST SERPL-MCNC: 211 MG/DL (ref 0–200)
CO2 SERPL-SCNC: 27 MMOL/L (ref 22–29)
CREAT SERPL-MCNC: 0.87 MG/DL (ref 0.57–1)
DEPRECATED RDW RBC AUTO: 40.6 FL (ref 37–54)
EGFRCR SERPLBLD CKD-EPI 2021: 67.9 ML/MIN/1.73
EOSINOPHIL # BLD AUTO: 0.14 10*3/MM3 (ref 0–0.4)
EOSINOPHIL NFR BLD AUTO: 2.5 % (ref 0.3–6.2)
ERYTHROCYTE [DISTWIDTH] IN BLOOD BY AUTOMATED COUNT: 12.3 % (ref 12.3–15.4)
GLOBULIN UR ELPH-MCNC: 2.2 GM/DL
GLUCOSE SERPL-MCNC: 91 MG/DL (ref 65–99)
HCT VFR BLD AUTO: 40.4 % (ref 34–46.6)
HDLC SERPL-MCNC: 86 MG/DL (ref 40–60)
HGB BLD-MCNC: 13.7 G/DL (ref 12–15.9)
IMM GRANULOCYTES # BLD AUTO: 0.01 10*3/MM3 (ref 0–0.05)
IMM GRANULOCYTES NFR BLD AUTO: 0.2 % (ref 0–0.5)
LDLC SERPL CALC-MCNC: 111 MG/DL (ref 0–100)
LDLC/HDLC SERPL: 1.26 {RATIO}
LYMPHOCYTES # BLD AUTO: 2.5 10*3/MM3 (ref 0.7–3.1)
LYMPHOCYTES NFR BLD AUTO: 44.2 % (ref 19.6–45.3)
MCH RBC QN AUTO: 30.8 PG (ref 26.6–33)
MCHC RBC AUTO-ENTMCNC: 33.9 G/DL (ref 31.5–35.7)
MCV RBC AUTO: 90.8 FL (ref 79–97)
MONOCYTES # BLD AUTO: 0.54 10*3/MM3 (ref 0.1–0.9)
MONOCYTES NFR BLD AUTO: 9.5 % (ref 5–12)
NEUTROPHILS NFR BLD AUTO: 2.41 10*3/MM3 (ref 1.7–7)
NEUTROPHILS NFR BLD AUTO: 42.5 % (ref 42.7–76)
NRBC BLD AUTO-RTO: 0 /100 WBC (ref 0–0.2)
PLATELET # BLD AUTO: 220 10*3/MM3 (ref 140–450)
PMV BLD AUTO: 11.4 FL (ref 6–12)
POTASSIUM SERPL-SCNC: 4.5 MMOL/L (ref 3.5–5.2)
PROT SERPL-MCNC: 7.1 G/DL (ref 6–8.5)
RBC # BLD AUTO: 4.45 10*6/MM3 (ref 3.77–5.28)
SODIUM SERPL-SCNC: 142 MMOL/L (ref 136–145)
TRIGL SERPL-MCNC: 82 MG/DL (ref 0–150)
TSH SERPL DL<=0.05 MIU/L-ACNC: 1.43 UIU/ML (ref 0.27–4.2)
VLDLC SERPL-MCNC: 14 MG/DL (ref 5–40)
WBC NRBC COR # BLD AUTO: 5.66 10*3/MM3 (ref 3.4–10.8)

## 2024-01-23 PROCEDURE — 84443 ASSAY THYROID STIM HORMONE: CPT

## 2024-01-23 PROCEDURE — 80053 COMPREHEN METABOLIC PANEL: CPT

## 2024-01-23 PROCEDURE — 85025 COMPLETE CBC W/AUTO DIFF WBC: CPT

## 2024-01-23 PROCEDURE — 80061 LIPID PANEL: CPT

## 2024-01-24 ENCOUNTER — OFFICE VISIT (OUTPATIENT)
Dept: INTERNAL MEDICINE | Facility: CLINIC | Age: 79
End: 2024-01-24
Payer: MEDICARE

## 2024-01-24 VITALS
WEIGHT: 144 LBS | SYSTOLIC BLOOD PRESSURE: 138 MMHG | BODY MASS INDEX: 24.59 KG/M2 | HEIGHT: 64 IN | HEART RATE: 86 BPM | DIASTOLIC BLOOD PRESSURE: 70 MMHG | OXYGEN SATURATION: 98 %

## 2024-01-24 DIAGNOSIS — E55.9 VITAMIN D DEFICIENCY: ICD-10-CM

## 2024-01-24 DIAGNOSIS — R20.0 NUMBNESS AND TINGLING IN LEFT HAND: ICD-10-CM

## 2024-01-24 DIAGNOSIS — M15.9 PRIMARY OSTEOARTHRITIS INVOLVING MULTIPLE JOINTS: ICD-10-CM

## 2024-01-24 DIAGNOSIS — I10 ESSENTIAL HYPERTENSION: ICD-10-CM

## 2024-01-24 DIAGNOSIS — Z00.00 MEDICARE ANNUAL WELLNESS VISIT, SUBSEQUENT: Primary | ICD-10-CM

## 2024-01-24 DIAGNOSIS — E78.2 MIXED HYPERLIPIDEMIA: ICD-10-CM

## 2024-01-24 DIAGNOSIS — R20.2 NUMBNESS AND TINGLING IN LEFT HAND: ICD-10-CM

## 2024-01-24 NOTE — PROGRESS NOTES
QUICK REFERENCE INFORMATION:  The ABCs of the Annual Wellness Visit    Subsequent Medicare Wellness Visit    HEALTH RISK ASSESSMENT    1945    Recent Hospitalizations:  No hospitalization(s) within the last year..        Current Medical Providers:  Patient Care Team:  Wei Skelton MD as PCP - General (Internal Medicine)  Wei Skelton MD as PCP - Family Medicine  Reginald Dove MD as Consulting Physician (Ophthalmology)  Sammy Delvalle MD as Consulting Physician (Gastroenterology)  Becca Rico MD as Consulting Physician (Dermatology)        Smoking Status:  Social History     Tobacco Use   Smoking Status Former    Packs/day: 0.25    Years: 1.00    Additional pack years: 0.00    Total pack years: 0.25    Types: Cigarettes    Quit date: 1964    Years since quittin.1   Smokeless Tobacco Never   Tobacco Comments    1 CIGARETTES         Alcohol Consumption:  Social History     Substance and Sexual Activity   Alcohol Use Yes    Alcohol/week: 7.0 standard drinks of alcohol    Types: 7 Glasses of wine per week       Depression Screen:       2024     3:02 PM   PHQ-2/PHQ-9 Depression Screening   Little Interest or Pleasure in Doing Things 0-->not at all   Feeling Down, Depressed or Hopeless 0-->not at all   PHQ-9: Brief Depression Severity Measure Score 0       Health Habits and Functional and Cognitive Screenin/17/2024     7:08 PM   Functional & Cognitive Status   Do you have difficulty preparing food and eating? No   Do you have difficulty bathing yourself, getting dressed or grooming yourself? No   Do you have difficulty using the toilet? No   Do you have difficulty moving around from place to place? No   Do you have trouble with steps or getting out of a bed or a chair? No   Current Diet Well Balanced Diet   Dental Exam Up to date   Eye Exam Up to date   Exercise (times per week) 2 times per week   Current Exercises Include Gardening;Walking   Do you need help using the phone?  No  "  Are you deaf or do you have serious difficulty hearing?  No   Do you need help to go to places out of walking distance? No   Do you need help shopping? No   Do you need help preparing meals?  No   Do you need help with housework?  No   Do you need help with laundry? No   Do you need help taking your medications? No   Do you need help managing money? No   Do you ever drive or ride in a car without wearing a seat belt? No   Have you felt unusual stress, anger or loneliness in the last month? No   Who do you live with? Alone   If you need help, do you have trouble finding someone available to you? No   Have you been bothered in the last four weeks by sexual problems? No   Do you have difficulty concentrating, remembering or making decisions? No       Fall Risk Screen:  ANISA Fall Risk Assessment was completed, and patient is at LOW risk for falls.Assessment completed on:1/24/2024    ACE III MINI        Does the patient have evidence of cognitive impairment? No    Aspirin use counseling: Does not need ASA (and currently is not on it)    Recent Lab Results:  CMP:  Lab Results   Component Value Date    BUN 15 01/23/2024    CREATININE 0.87 01/23/2024    EGFRIFNONA 75 11/23/2021    EGFRIFAFRI 91 11/23/2021    BCR 17.2 01/23/2024     01/23/2024    K 4.5 01/23/2024    CO2 27.0 01/23/2024    CALCIUM 10.1 01/23/2024    PROTENTOTREF 7.0 12/12/2022    ALBUMIN 4.9 01/23/2024    LABGLOBREF 2.1 12/12/2022    LABIL2 2.3 12/12/2022    BILITOT 0.5 01/23/2024    ALKPHOS 106 01/23/2024    AST 18 01/23/2024    ALT 14 01/23/2024     HbA1c:  No results found for: \"HGBA1C\"  Microalbumin:  Lab Results   Component Value Date    MICROALBUR 12.1 11/11/2020     Lipid Panel  Lab Results   Component Value Date    CHOL 211 (H) 01/23/2024    TRIG 82 01/23/2024    HDL 86 (H) 01/23/2024     (H) 01/23/2024    AST 18 01/23/2024    ALT 14 01/23/2024       Visual Acuity:  No results found.    Age-appropriate Screening Schedule:  Refer to " the list below for future screening recommendations based on patient's age, sex and/or medical conditions. Orders for these recommended tests are listed in the plan section. The patient has been provided with a written plan.    Health Maintenance   Topic Date Due    ZOSTER VACCINE (2 of 3) 10/10/2016    HEPATITIS C SCREENING  Never done    DXA SCAN  05/13/2023    INFLUENZA VACCINE  08/01/2023    COVID-19 Vaccine (4 - 2023-24 season) 09/01/2023    COLORECTAL CANCER SCREENING  06/17/2024    LIPID PANEL  01/23/2025    ANNUAL WELLNESS VISIT  01/24/2025    TDAP/TD VACCINES (2 - Td or Tdap) 04/20/2025    Pneumococcal Vaccine 65+  Completed        Subjective   History of Present Illness    Lucia Mc is a 78 y.o. female who presents for a Subsequent Wellness Visit and physical examination. She is complaining of numbness and tingling in the left upper extremity and hand, with essential hypertension and mixed hyperlipidemia.    The patient has been having intermittent tingling in her left upper extremity and hand for a couple of months. She inquires if it is attributable to arthritis as it is more severe in her hand. These episodes of paresthesia are brief and she will have up to 4 in 1 day. The paresthesia does not wake her in the night. She denies waking up with these symptoms. She denies any pain in her cervical spine or left shoulder. She denies any injury. She does not perform lifting exercises. She rarely uses the computer or does any typing. She denies any headache, dizziness, lightheadedness, sinus symptoms, or post-nasal drainage. She denies any medication changes.    She has arthritis in her left hand. She has difficulty opening jars with her left hand. She is able to open jars with her right hand. She is right hand dominant.    She has a cataract in her right eye. This is being monitored. Her vision is stable. She is able to drive at night. She uses Lumify eye drops for dry eyes.    Her blood pressure is  130/72 mmHg in clinic.    She has occasional urinary urgency when she has had a significant amount of fluid. She hydrates adequately. She has purchased incontinence pads to use when she is outside of her home. She denies arthralgia in her hips, knees, or ankles. She denies issues with edema.    She denies hearing loss, tinnitus, or buzzing. Her mouth and teeth are normal. She denies dysphagia She denies any coughing, wheezing, dyspnea, or recent lung infections. She denies any irritation or soreness in her lungs. She is due for a mammogram in 05/2024. She went to the dermatologist for her 6-month checkup in 01/2024. She denies any heart racing, skipping, or palpitations. She denies any gastric symptoms, nausea, vomiting, or upset stomach.     CHRONIC CONDITIONS    The following portions of the patient's history were reviewed and updated as appropriate: allergies, current medications, past family history, past medical history, past social history, and past surgical history.    Outpatient Medications Prior to Visit   Medication Sig Dispense Refill    cholecalciferol (VITAMIN D3) 1000 units tablet Take 1 tablet by mouth Daily.      lisinopril (PRINIVIL,ZESTRIL) 30 MG tablet TAKE 1 TABLET EVERY DAY 90 tablet 3    Multiple Vitamins-Minerals (MULTIVITAMIN WITH MINERALS) tablet tablet Take 1 tablet by mouth Daily.      Probiotic Product (PROBIOTIC-10) capsule Take  by mouth Daily.      simvastatin (ZOCOR) 20 MG tablet TAKE 1 TABLET EVERY EVENING 90 tablet 3    Wheat Dextrin (Benefiber) powder Take 1 each by mouth Daily.      temazepam (RESTORIL) 30 MG capsule Take 1 capsule by mouth At Night As Needed for Sleep. (Patient not taking: Reported on 1/24/2024)       No facility-administered medications prior to visit.       Patient Active Problem List   Diagnosis    Insomnia, unspecified    Post-menopausal    Screening mammogram, encounter for    Osteoporosis    Hyperlipidemia    Primary osteoarthritis involving multiple joints     Early cataract    Essential hypertension    Vitamin D deficiency    Left ear impacted cerumen    Osteopenia    Medicare annual wellness visit, subsequent    Polyp of colon    Alcohol use    Kyphosis, acquired    Histoplasmosis    Lower abdominal pain    Irritable bowel syndrome    Cataract of left eye    Acute midline thoracic back pain    Stress    Medicare annual wellness visit, subsequent    COVID-19 virus infection    Flu    Medicare annual wellness visit, subsequent    Medicare annual wellness visit, subsequent    Numbness and tingling in left hand       Advance Care Planning:  ACP discussion was held with the patient during this visit. Patient has an advance directive in EMR which is still valid.     Identification of Risk Factors:  Risk factors include: Cardiovascular risk.    Review of Systems  HEENT: Denies any hearing changes, eyesight changes, no headache or dizziness  NECK: Denies any pain, stiffness, swelling or dysphagia  CHEST: Denies cough or wheeze or recent lung infections  CARDIAC: Denies chest pain, pressure or palpitations  ABD: Denies nausea, vomiting or abdominal pain  : Denies dysuria  NEURO: Denies syncope, concussion, neuropathy complains of paresthesias left hand and forearm  PSYCH: Denies any stress  EXTREM: Denies arthritic changes myalgia or arthralgia complains of occasional back discomfort and knee discomfort  SKIN: Denies any rash    Compared to one year ago, the patient feels her physical health is the same.  Compared to one year ago, the patient feels her mental health is the same.    Objective     Physical Exam   HEENT: Pupils equal reactive ENT clear, no facial asymmetry, the pharynx is clear  NECK: No masses bruit or thyromegaly  CHEST: Clear without rales wheezes or rhonchi  CARDIAC: Regular rhythm without gallop rub or click, blood pressure heart rate stable  ABD: Liver spleen normal, good bowel sounds, nontender  : Deferred  NEURO: Intact  PSYCH: Normal  EXTREM: No  "significant arthritic changes, no edema good range of motion fingers and wrist left elbow left shoulder no axillary adenopathy  SKIN: Clear      ECG 12 Lead    Date/Time: 1/25/2024 6:23 AM  Performed by: Wei Skelton MD    Authorized by: Wei Skelton MD  Comparison: compared with previous ECG from 12/12/2022  Similar to previous ECG  Comparison to previous ECG: Current EKG is normal no ischemia and similar to EKG of December 12, 2022  Rhythm: sinus rhythm  Rate: normal  Conduction: conduction normal  ST Segments: ST segments normal  T Waves: T waves normal  QRS axis: normal  Other: no other findings    Clinical impression: normal ECG  Comments: Current EKG is normal sinus rhythm without ischemia normal tracing and similar to EKG of December 12, 2022         Patient Wellness Counseling:   Plan of care reviewed with patient at the conclusion of today's visit. Education was provided in regards to diagnosis, diet and exercise, cervical cancer screening, self breast exams, breast cancer screening, and the importance of yearly mammograms.   Nutrition, family planning/contraception, physical activity, healthy weight,ways to reduce stress, adequate sleep, injury prevention, misuse of tobacco, alcohol and drugs, sexual behavior and STD's, dental health, mental health, and immunizations.    Management and any prescribed or recommended OTC medications.  Patient verbalizes understanding of and agreement with management plan.  Vitals:    01/24/24 1453   BP: 138/70   BP Location: Left arm   Patient Position: Sitting   Cuff Size: Adult   Pulse: 86   SpO2: 98%   Weight: 65.3 kg (144 lb)   Height: 162.6 cm (64\")   PainSc: 0-No pain       BMI is within normal parameters. No other follow-up for BMI required.    Results Review:  Laboratory results obtained on 01/23/2024 were reviewed with the patient. Her thyroid was within normal limits. Her total cholesterol was 211 mg/dL, LDL was 111 mg/dL, and HDL was 86 mg/dL. Blood glucose " was 91 mg/dL. Kidney function was within normal limits. Liver function was within normal limits. Electrolytes were within normal limits. She is not anemic and her blood count was within normal limits.    Assessment & Plan   Problem List Items Addressed This Visit          Cardiac and Vasculature    Hyperlipidemia    Overview     History of mixed hyperlipidemia on simvastatin 20 mg daily no history of myalgia arthralgia          Current Assessment & Plan       Mixed hyperlipidemia on simvastatin 20 mg daily denies myalgia or arthralgia recent cholesterol January 23 was 211 with LDL of 111 and HDL of 86 with normal liver function continue therapy         Relevant Medications    simvastatin (ZOCOR) 20 MG tablet    Essential hypertension    Overview     History of essential hypertension with current blood pressure 122/80 on lisinopril 20 mg daily denies headache or cough blood pressure stable EKG is normal          Current Assessment & Plan       Essential hypertension current blood pressure 138/70 and heart rate of 86 and O2 saturation of 98% on lisinopril 30 mg daily denies headache or cough CMP was normal EKG of January 24 is normal continue therapy         Relevant Medications    lisinopril (PRINIVIL,ZESTRIL) 30 MG tablet    Other Relevant Orders    ECG 12 Lead       Endocrine and Metabolic    Vitamin D deficiency    Overview     History of vitamin D deficiency on vitamin D3 1000 units daily and multiple vitamins with current level normal at 35.5 suggest continue therapy.         Current Assessment & Plan     On cholecalciferol D3 1000 units daily continue therapy            Health Encounters    Medicare annual wellness visit, subsequent - Primary    Overview     78-year-old female presents for Medicare annual wellness visit and physical examination on January 24, 2024            Musculoskeletal and Injuries    Primary osteoarthritis involving multiple joints    Overview     Multi-joint knowledgeable arthritis  disease with back pain and and bilateral knee discomfort overall doing well PRN NSAID therapy or Tylenol are beneficial         Current Assessment & Plan     Mild degenerative arthritis disease primarily of the low back and bilateral knees takes as needed NSAID therapy or Tylenol and doing well            Symptoms and Signs    Numbness and tingling in left hand    Overview     Patient complains of last several weeks of discomfort numbness and tingling in the left arm and hand including all 5 fingers it is intermittent lasts a short time does not seem to be associated with previous injuries or falls she is right-handed has no reduced range of motion of her fingers wrist elbow or shoulder and denies neck pain discomfort         Current Assessment & Plan     Examination reveals no evidence of carpal tunnel syndrome elbow epicondylitis or shoulder dysfunction I feel no axillary adenopathy on the left cervical spine has mild tilting reduced range of motion to the left and I believe this is related to cervical arthritic radiculopathy.  I have asked the patient to place heat on her neck and to take Tylenol 2 tablets 3 times a day, the patient may need physical therapy but she is leaving [facility] this weekend for 1 month she will be in [place] and can seek further medical care if something acute occurs with the left hand or arm but I think reduced lifting with heat and Tylenol will be of benefit and if not we will arrange cervical spine x-rays and physical therapy on return          Patient Self-Management and Personalized Health Advice  The patient has been provided with information about: prevention of cardiac or vascular disease and preventive services including:   Annual Wellness Visit (AWV).    Outpatient Encounter Medications as of 1/24/2024   Medication Sig Dispense Refill    cholecalciferol (VITAMIN D3) 1000 units tablet Take 1 tablet by mouth Daily.      lisinopril (PRINIVIL,ZESTRIL) 30 MG tablet TAKE 1  TABLET EVERY DAY 90 tablet 3    Multiple Vitamins-Minerals (MULTIVITAMIN WITH MINERALS) tablet tablet Take 1 tablet by mouth Daily.      Probiotic Product (PROBIOTIC-10) capsule Take  by mouth Daily.      simvastatin (ZOCOR) 20 MG tablet TAKE 1 TABLET EVERY EVENING 90 tablet 3    Wheat Dextrin (Benefiber) powder Take 1 each by mouth Daily.      temazepam (RESTORIL) 30 MG capsule Take 1 capsule by mouth At Night As Needed for Sleep. (Patient not taking: Reported on 1/24/2024)       No facility-administered encounter medications on file as of 1/24/2024.       Reviewed use of high risk medication in the elderly: yes  Reviewed for potential of harmful drug interactions in the elderly: yes    Follow Up:  Return in about 6 months (around 7/24/2024) for Recheck.     Patient Instructions   Lab discussed  EKG normal discussed  Continue current medications  Patient has cervical arthritis with left arm radiculopathy and would prefer physical therapy however she is leaving town for 1 month for [place]  Suggest heat application to the neck twice daily for 10 minutes  Suggest Tylenol 2 tablets twice daily  If neck does not improve or paresthesias continue will refer to physical therapy and obtain cervical spine x-rays  Continue other cardiac diet  Continue ADL active lifestyle and exercise  Return visit in 6 months or as needed    An After Visit Summary and PPPS with all of these plans were given to the patient.           Transcribed from ambient dictation for Wei Skelton MD by Shanel French.  01/24/24   20:16 EST    Patient or patient representative verbalized consent to the visit recording.  I have personally performed the services described in this document as transcribed by the above individual, and it is both accurate and complete.

## 2024-01-24 NOTE — PATIENT INSTRUCTIONS
Lab discussed  EKG normal discussed  Continue current medications  Patient has cervical arthritis with left arm radiculopathy and would prefer physical therapy however she is leaving town for 1 month for [place]  Suggest heat application to the neck twice daily for 10 minutes  Suggest Tylenol 2 tablets twice daily  If neck does not improve or paresthesias continue will refer to physical therapy and obtain cervical spine x-rays  Continue other cardiac diet  Continue ADL active lifestyle and exercise  Return visit in 6 months or as needed   36.4

## 2024-01-25 PROBLEM — R20.0 NUMBNESS AND TINGLING IN LEFT HAND: Status: ACTIVE | Noted: 2024-01-25

## 2024-01-25 PROBLEM — R20.2 NUMBNESS AND TINGLING IN LEFT HAND: Status: ACTIVE | Noted: 2024-01-25

## 2024-01-25 NOTE — ASSESSMENT & PLAN NOTE
Mixed hyperlipidemia on simvastatin 20 mg daily denies myalgia or arthralgia recent cholesterol January 23 was 211 with LDL of 111 and HDL of 86 with normal liver function continue therapy

## 2024-01-25 NOTE — ASSESSMENT & PLAN NOTE
Essential hypertension current blood pressure 138/70 and heart rate of 86 and O2 saturation of 98% on lisinopril 30 mg daily denies headache or cough CMP was normal EKG of January 24 is normal continue therapy

## 2024-01-25 NOTE — ASSESSMENT & PLAN NOTE
Examination reveals no evidence of carpal tunnel syndrome elbow epicondylitis or shoulder dysfunction I feel no axillary adenopathy on the left cervical spine has mild tilting reduced range of motion to the left and I believe this is related to cervical arthritic radiculopathy.  I have asked the patient to place heat on her neck and to take Tylenol 2 tablets 3 times a day, the patient may need physical therapy but she is leaving [facility] this weekend for 1 month she will be in [place] and can seek further medical care if something acute occurs with the left hand or arm but I think reduced lifting with heat and Tylenol will be of benefit and if not we will arrange cervical spine x-rays and physical therapy on return

## 2024-01-25 NOTE — ASSESSMENT & PLAN NOTE
Mild degenerative arthritis disease primarily of the low back and bilateral knees takes as needed NSAID therapy or Tylenol and doing well

## 2024-04-18 ENCOUNTER — TRANSCRIBE ORDERS (OUTPATIENT)
Dept: ADMINISTRATIVE | Facility: HOSPITAL | Age: 79
End: 2024-04-18
Payer: MEDICARE

## 2024-04-18 DIAGNOSIS — Z12.31 SCREENING MAMMOGRAM FOR BREAST CANCER: Primary | ICD-10-CM

## 2024-06-01 ENCOUNTER — HOSPITAL ENCOUNTER (OUTPATIENT)
Dept: MAMMOGRAPHY | Facility: HOSPITAL | Age: 79
Discharge: HOME | End: 2024-06-01
Admitting: INTERNAL MEDICINE
Payer: MEDICARE

## 2024-06-01 DIAGNOSIS — Z12.31 SCREENING MAMMOGRAM FOR BREAST CANCER: ICD-10-CM

## 2024-06-01 PROCEDURE — 77067 SCR MAMMO BI INCL CAD: CPT

## 2024-06-01 PROCEDURE — 77063 BREAST TOMOSYNTHESIS BI: CPT

## 2024-08-22 ENCOUNTER — OFFICE VISIT (OUTPATIENT)
Dept: INTERNAL MEDICINE | Facility: CLINIC | Age: 79
End: 2024-08-22
Payer: MEDICARE

## 2024-08-22 VITALS
WEIGHT: 137 LBS | HEIGHT: 64 IN | BODY MASS INDEX: 23.39 KG/M2 | SYSTOLIC BLOOD PRESSURE: 120 MMHG | OXYGEN SATURATION: 99 % | HEART RATE: 76 BPM | DIASTOLIC BLOOD PRESSURE: 70 MMHG

## 2024-08-22 DIAGNOSIS — I10 ESSENTIAL HYPERTENSION: Primary | ICD-10-CM

## 2024-08-22 DIAGNOSIS — E78.2 MIXED HYPERLIPIDEMIA: ICD-10-CM

## 2024-08-22 RX ORDER — LISINOPRIL 30 MG/1
30 TABLET ORAL DAILY
Qty: 90 TABLET | Refills: 3 | Status: SHIPPED | OUTPATIENT
Start: 2024-08-22

## 2024-08-22 NOTE — ASSESSMENT & PLAN NOTE
Essential hypertension with initial blood pressure 120/70 repeated at 138/82 left and right with heart rate of 76 and O2 saturation 99% the patient had previously been on lisinopril 30 but ran out of this dose and started taking her lisinopril 20 mg daily and still slight increase in pressure  Will renew lisinopril 30 mg daily and prescription sent to her drugstore

## 2024-08-22 NOTE — PROGRESS NOTES
[facility]     Lucia Mc  1945   [id number]      Patient Care Team:  Wei Skelton MD as PCP - General (Internal Medicine)  Wei Skelton MD as PCP - Family Medicine  Reginald Dove MD as Consulting Physician (Ophthalmology)  Sammy Delvalle MD as Consulting Physician (Gastroenterology)  Becca Rico MD as Consulting Physician (Dermatology)    Chief Complaint::   Chief Complaint   Patient presents with    Hypertension     6 mo follow up.  She ran out of the lisinopril 30 mg so she's been taking 20 mg tabs for awhile    Med Refill     Lisinopril 30 mg.             HPI  History of Present Illness  The patient is an 79-year-old female who presents to the office for a visit concerning hypertension and mixed hyperlipidemia. She has exhausted her supply of lisinopril 30 mg and has been taking 20 mg daily.    She reports that her blood pressure readings have been satisfactory. She has been making efforts to lose weight, having felt slightly overweight. She denies experiencing headaches or dizziness. She does not monitor her blood pressure at home.    Supplemental Information  She has sinus allergies and congestion. She denies coughing, wheezing, shortness of breath, chest pain, heart skipping, or stomach issues. She has been very active.      Patient Active Problem List   Diagnosis    Insomnia, unspecified    Post-menopausal    Screening mammogram, encounter for    Osteoporosis    Hyperlipidemia    Primary osteoarthritis involving multiple joints    Early cataract    Essential hypertension    Vitamin D deficiency    Left ear impacted cerumen    Osteopenia    Medicare annual wellness visit, subsequent    Polyp of colon    Alcohol use    Kyphosis, acquired    Histoplasmosis    Lower abdominal pain    Irritable bowel syndrome    Cataract of left eye    Acute midline thoracic back pain    Stress    Medicare annual wellness visit, subsequent    COVID-19 virus infection    Flu    Medicare  annual wellness visit, subsequent    Medicare annual wellness visit, subsequent    Numbness and tingling in left hand        Past Medical History:   Diagnosis Date    Arthritis     Hyperlipidemia     Hypertension     Ovarian cyst        Past Surgical History:   Procedure Laterality Date    APPENDECTOMY       SECTION      COLONOSCOPY  2009    OVARIAN CYST SURGERY      TUBAL ABDOMINAL LIGATION Bilateral        Family History   Problem Relation Age of Onset    Breast cancer Maternal Aunt 70    Pancreatic cancer Other     COPD Other     Diabetes Other     Ovarian cancer Neg Hx     Uterine cancer Neg Hx     Endometrial cancer Neg Hx     Colon cancer Neg Hx        Social History     Socioeconomic History    Marital status:    Tobacco Use    Smoking status: Former     Current packs/day: 0.00     Average packs/day: 0.3 packs/day for 1 year (0.3 ttl pk-yrs)     Types: Cigarettes     Start date: 1963     Quit date: 1964     Years since quittin.6    Smokeless tobacco: Never    Tobacco comments:     1 CIGARETTES     Vaping Use    Vaping status: Never Used   Substance and Sexual Activity    Alcohol use: Yes     Alcohol/week: 7.0 standard drinks of alcohol     Types: 7 Glasses of wine per week    Drug use: No    Sexual activity: Never     Birth control/protection: Other       No Known Allergies    Review of Systems     HEENT: Denies headache or dizzy vision or hearing change or sinus infections  NECK: Denies pain or stiffness dysphagia or reflux  CHEST: Denies cough or wheeze or recent lung infection  CARDIAC: Denies chest pain pressure palpitations recently ran out of her dose of lisinopril 30 and has been taking 20 mg daily  ABD: Denies nausea vomiting  : Denies dysuria frequency  NEURO: Denies syncope concussion neuropathy  PSYCH: Denies anxiety depression  EXTREM: Denies pain discomfort or leg edema  SKIN: Denies rash    Vital Signs  Vitals:    24 1415   BP: 120/70   BP Location:  "Left arm   Patient Position: Sitting   Cuff Size: Adult   Pulse: 76   SpO2: 99%   Weight: 62.1 kg (137 lb)   Height: 162.6 cm (64\")   PainSc: 0-No pain     Body mass index is 23.52 kg/m².  BMI is within normal parameters. No other follow-up for BMI required.     Advance Care Planning   ACP discussion was held with the patient during this visit. Patient has an advance directive (not in EMR), copy requested.       Current Outpatient Medications:     cholecalciferol (VITAMIN D3) 1000 units tablet, Take 1 tablet by mouth Daily., Disp: , Rfl:     lisinopril (PRINIVIL,ZESTRIL) 30 MG tablet, Take 1 tablet by mouth Daily., Disp: 90 tablet, Rfl: 3    Multiple Vitamins-Minerals (MULTIVITAMIN WITH MINERALS) tablet tablet, Take 1 tablet by mouth Daily., Disp: , Rfl:     Probiotic Product (PROBIOTIC-10) capsule, Take  by mouth Daily., Disp: , Rfl:     simvastatin (ZOCOR) 20 MG tablet, TAKE 1 TABLET EVERY EVENING, Disp: 90 tablet, Rfl: 3    Wheat Dextrin (Benefiber) powder, Take 1 each by mouth Daily., Disp: , Rfl:     temazepam (RESTORIL) 30 MG capsule, Take 1 capsule by mouth At Night As Needed for Sleep. (Patient not taking: Reported on 1/24/2024), Disp:  , Rfl:     Physical Exam     ACE III MINI        Physical Exam  Physical Exam  Vital Signs  Blood pressure reading is 140/80.  HEENT: Pupils equal reactive ENT clear no facial symmetry pharynx clear sinuses normal  NECK: No mass.  Thyromegaly neck vein distention  CHEST: Clear without rales or wheezing  CARDIAC: Regular rhythm without gallop or rub blood pressure 140/80 heart rate normal  ABD: Liver and spleen normal positive bowel sounds no bruit  : Deferred  NEURO: Intact  PSYCH: Normal  EXTREM: Minimal arthritic change no edema  Skin: Clear     Results Review:    No results found for this or any previous visit (from the past 672 hour(s)).    Procedures    Medication Review: Medications reviewed and noted    Patient wellness counseling  Exercise: Encouraged active " lifestyle and walking exercise  Diet: Encouraged healthy cardiac diet  Screening:  Social History     Socioeconomic History    Marital status:    Tobacco Use    Smoking status: Former     Current packs/day: 0.00     Average packs/day: 0.3 packs/day for 1 year (0.3 ttl pk-yrs)     Types: Cigarettes     Start date: 1963     Quit date: 1964     Years since quittin.6    Smokeless tobacco: Never    Tobacco comments:     1 CIGARETTES     Vaping Use    Vaping status: Never Used   Substance and Sexual Activity    Alcohol use: Yes     Alcohol/week: 7.0 standard drinks of alcohol     Types: 7 Glasses of wine per week    Drug use: No    Sexual activity: Never     Birth control/protection: Other        Assessment/Plan:    Assessment & Plan  1. Hypertension.  Her blood pressure readings have been fluctuating, with recent measurements around 140/80 mmHg. She has been taking lisinopril 20 mg daily after running out of her 30 mg supply. Given her stable condition and recent weight loss of 7 pounds, it is deemed necessary to resume her previous dosage. A prescription for lisinopril 30 mg will be sent to [facility]'s pharmacy. She is advised to monitor her blood pressure at home regularly. If she experiences any side effects or symptoms such as lightheadedness, she should contact the office.    2. Mixed Hyperlipidemia.  No specific treatment changes were discussed during this visit. She should continue with her current management plan and maintain a healthy diet and exercise routine.    Follow-up  The patient will follow up in 5 months.    Problem List Items Addressed This Visit          Cardiac and Vasculature    Hyperlipidemia    Overview     History of mixed hyperlipidemia on simvastatin 20 mg daily no history of myalgia arthralgia          Current Assessment & Plan      Mixed hyperlipidemia on simvastatin 20 mg daily denies myalgia arthralgia continue therapy         Relevant Medications    simvastatin (ZOCOR) 20  MG tablet    Essential hypertension - Primary    Overview     History of essential hypertension with current blood pressure 122/80 on lisinopril 20 mg daily denies headache or cough blood pressure stable EKG is normal          Current Assessment & Plan     Essential hypertension with initial blood pressure 120/70 repeated at 138/82 left and right with heart rate of 76 and O2 saturation 99% the patient had previously been on lisinopril 30 but ran out of this dose and started taking her lisinopril 20 mg daily and still slight increase in pressure  Will renew lisinopril 30 mg daily and prescription sent to her drugstore         Relevant Medications    lisinopril (PRINIVIL,ZESTRIL) 30 MG tablet        Patient Instructions   Refill lisinopril 30 mg daily  Continue all current medications  Continue excellent exercise and healthy cardiac diet and weight loss  Return visit for annual wellness in 5 months after January 24     Plan of care reviewed with patient at the conclusion of today's visit. Education was provided regarding diagnosis, management, and any prescribed or recommended OTC medications.Patient verbalizes understanding of and agreement with management plan.     08/22/24   14:23 EDT    Patient or patient representative verbalized consent for the use of Ambient Listening during the visit with  Wei Skelton MD for chart documentation. 8/24/2024  16:32 EDT

## 2024-08-22 NOTE — PATIENT INSTRUCTIONS
Refill lisinopril 30 mg daily  Continue all current medications  Continue excellent exercise and healthy cardiac diet and weight loss  Return visit for annual wellness in 5 months after January 24

## 2025-01-23 ENCOUNTER — TELEPHONE (OUTPATIENT)
Dept: INTERNAL MEDICINE | Facility: CLINIC | Age: 80
End: 2025-01-23
Payer: MEDICARE

## 2025-01-23 DIAGNOSIS — E55.9 VITAMIN D DEFICIENCY: ICD-10-CM

## 2025-01-23 DIAGNOSIS — E78.2 MIXED HYPERLIPIDEMIA: ICD-10-CM

## 2025-01-23 DIAGNOSIS — I10 ESSENTIAL HYPERTENSION: Primary | ICD-10-CM

## 2025-01-23 NOTE — TELEPHONE ENCOUNTER
"Caller: Lucia Mc \"Ifeoma\"    Relationship: Self    Best call back number: [phone]     What orders are you requesting (i.e. lab or imaging): BLOOD WORK     In what timeframe would the patient need to come in: BEFORE 1/27/25     Where will you receive your lab/imaging services: [facility]     Additional notes: PATIENT HAS A MEDICARE WELLNESS VISIT ON 1/27/25 AND WOULD LIKE TO GET THE BLOOD WORK DONE PRIOR TO THE VISIT.       "

## 2025-01-24 ENCOUNTER — LAB (OUTPATIENT)
Dept: LAB | Facility: HOSPITAL | Age: 80
End: 2025-01-24
Payer: MEDICARE

## 2025-01-24 DIAGNOSIS — I10 ESSENTIAL HYPERTENSION: ICD-10-CM

## 2025-01-24 DIAGNOSIS — E55.9 VITAMIN D DEFICIENCY: ICD-10-CM

## 2025-01-24 DIAGNOSIS — E78.2 MIXED HYPERLIPIDEMIA: ICD-10-CM

## 2025-01-24 LAB
25(OH)D3 SERPL-MCNC: 41.8 NG/ML (ref 30–100)
ALBUMIN SERPL-MCNC: 4.3 G/DL (ref 3.5–5.2)
ALBUMIN/GLOB SERPL: 1.7 G/DL
ALP SERPL-CCNC: 104 U/L (ref 39–117)
ALT SERPL W P-5'-P-CCNC: 18 U/L (ref 1–33)
ANION GAP SERPL CALCULATED.3IONS-SCNC: 11 MMOL/L (ref 5–15)
AST SERPL-CCNC: 25 U/L (ref 1–32)
BASOPHILS # BLD AUTO: 0.05 10*3/MM3 (ref 0–0.2)
BASOPHILS NFR BLD AUTO: 1 % (ref 0–1.5)
BILIRUB SERPL-MCNC: 0.6 MG/DL (ref 0–1.2)
BUN SERPL-MCNC: 10 MG/DL (ref 8–23)
BUN/CREAT SERPL: 12.3 (ref 7–25)
CALCIUM SPEC-SCNC: 9.8 MG/DL (ref 8.6–10.5)
CHLORIDE SERPL-SCNC: 101 MMOL/L (ref 98–107)
CHOLEST SERPL-MCNC: 200 MG/DL (ref 0–200)
CO2 SERPL-SCNC: 28 MMOL/L (ref 22–29)
CREAT SERPL-MCNC: 0.81 MG/DL (ref 0.57–1)
DEPRECATED RDW RBC AUTO: 40.8 FL (ref 37–54)
EGFRCR SERPLBLD CKD-EPI 2021: 73.5 ML/MIN/1.73
EOSINOPHIL # BLD AUTO: 0.14 10*3/MM3 (ref 0–0.4)
EOSINOPHIL NFR BLD AUTO: 2.8 % (ref 0.3–6.2)
ERYTHROCYTE [DISTWIDTH] IN BLOOD BY AUTOMATED COUNT: 12.3 % (ref 12.3–15.4)
GLOBULIN UR ELPH-MCNC: 2.6 GM/DL
GLUCOSE SERPL-MCNC: 81 MG/DL (ref 65–99)
HCT VFR BLD AUTO: 41.1 % (ref 34–46.6)
HDLC SERPL-MCNC: 90 MG/DL (ref 40–60)
HGB BLD-MCNC: 13.3 G/DL (ref 12–15.9)
IMM GRANULOCYTES # BLD AUTO: 0.01 10*3/MM3 (ref 0–0.05)
IMM GRANULOCYTES NFR BLD AUTO: 0.2 % (ref 0–0.5)
LDLC SERPL CALC-MCNC: 97 MG/DL (ref 0–100)
LDLC/HDLC SERPL: 1.06 {RATIO}
LYMPHOCYTES # BLD AUTO: 2.16 10*3/MM3 (ref 0.7–3.1)
LYMPHOCYTES NFR BLD AUTO: 43.3 % (ref 19.6–45.3)
MCH RBC QN AUTO: 29.9 PG (ref 26.6–33)
MCHC RBC AUTO-ENTMCNC: 32.4 G/DL (ref 31.5–35.7)
MCV RBC AUTO: 92.4 FL (ref 79–97)
MONOCYTES # BLD AUTO: 0.55 10*3/MM3 (ref 0.1–0.9)
MONOCYTES NFR BLD AUTO: 11 % (ref 5–12)
NEUTROPHILS NFR BLD AUTO: 2.08 10*3/MM3 (ref 1.7–7)
NEUTROPHILS NFR BLD AUTO: 41.7 % (ref 42.7–76)
NRBC BLD AUTO-RTO: 0 /100 WBC (ref 0–0.2)
PLATELET # BLD AUTO: 216 10*3/MM3 (ref 140–450)
PMV BLD AUTO: 11.1 FL (ref 6–12)
POTASSIUM SERPL-SCNC: 4.4 MMOL/L (ref 3.5–5.2)
PROT SERPL-MCNC: 6.9 G/DL (ref 6–8.5)
RBC # BLD AUTO: 4.45 10*6/MM3 (ref 3.77–5.28)
SODIUM SERPL-SCNC: 140 MMOL/L (ref 136–145)
TRIGL SERPL-MCNC: 72 MG/DL (ref 0–150)
TSH SERPL DL<=0.05 MIU/L-ACNC: 1.9 UIU/ML (ref 0.27–4.2)
VLDLC SERPL-MCNC: 13 MG/DL (ref 5–40)
WBC NRBC COR # BLD AUTO: 4.99 10*3/MM3 (ref 3.4–10.8)

## 2025-01-24 PROCEDURE — 85025 COMPLETE CBC W/AUTO DIFF WBC: CPT

## 2025-01-24 PROCEDURE — 80053 COMPREHEN METABOLIC PANEL: CPT

## 2025-01-24 PROCEDURE — 84443 ASSAY THYROID STIM HORMONE: CPT

## 2025-01-24 PROCEDURE — 80061 LIPID PANEL: CPT

## 2025-01-24 PROCEDURE — 82306 VITAMIN D 25 HYDROXY: CPT

## 2025-01-26 PROBLEM — Z00.00 MEDICARE ANNUAL WELLNESS VISIT, SUBSEQUENT: Status: ACTIVE | Noted: 2025-01-26

## 2025-01-27 ENCOUNTER — OFFICE VISIT (OUTPATIENT)
Dept: INTERNAL MEDICINE | Facility: CLINIC | Age: 80
End: 2025-01-27
Payer: MEDICARE

## 2025-01-27 VITALS
SYSTOLIC BLOOD PRESSURE: 146 MMHG | HEART RATE: 58 BPM | TEMPERATURE: 98 F | OXYGEN SATURATION: 98 % | HEIGHT: 64 IN | BODY MASS INDEX: 23.7 KG/M2 | WEIGHT: 138.8 LBS | DIASTOLIC BLOOD PRESSURE: 88 MMHG

## 2025-01-27 DIAGNOSIS — Z12.11 COLON CANCER SCREENING: ICD-10-CM

## 2025-01-27 DIAGNOSIS — I10 ESSENTIAL HYPERTENSION: ICD-10-CM

## 2025-01-27 DIAGNOSIS — E55.9 VITAMIN D DEFICIENCY: ICD-10-CM

## 2025-01-27 DIAGNOSIS — Z00.00 MEDICARE ANNUAL WELLNESS VISIT, SUBSEQUENT: Primary | ICD-10-CM

## 2025-01-27 DIAGNOSIS — H26.9 CATARACT OF LEFT EYE, UNSPECIFIED CATARACT TYPE: ICD-10-CM

## 2025-01-27 DIAGNOSIS — K63.5 POLYP OF COLON, UNSPECIFIED PART OF COLON, UNSPECIFIED TYPE: ICD-10-CM

## 2025-01-27 DIAGNOSIS — M15.0 PRIMARY OSTEOARTHRITIS INVOLVING MULTIPLE JOINTS: ICD-10-CM

## 2025-01-27 DIAGNOSIS — E78.2 MIXED HYPERLIPIDEMIA: ICD-10-CM

## 2025-01-27 PROCEDURE — 1170F FXNL STATUS ASSESSED: CPT | Performed by: INTERNAL MEDICINE

## 2025-01-27 PROCEDURE — 3079F DIAST BP 80-89 MM HG: CPT | Performed by: INTERNAL MEDICINE

## 2025-01-27 PROCEDURE — 1159F MED LIST DOCD IN RCRD: CPT | Performed by: INTERNAL MEDICINE

## 2025-01-27 PROCEDURE — G0439 PPPS, SUBSEQ VISIT: HCPCS | Performed by: INTERNAL MEDICINE

## 2025-01-27 PROCEDURE — 99213 OFFICE O/P EST LOW 20 MIN: CPT | Performed by: INTERNAL MEDICINE

## 2025-01-27 PROCEDURE — 1160F RVW MEDS BY RX/DR IN RCRD: CPT | Performed by: INTERNAL MEDICINE

## 2025-01-27 PROCEDURE — 3077F SYST BP >= 140 MM HG: CPT | Performed by: INTERNAL MEDICINE

## 2025-01-27 PROCEDURE — 99397 PER PM REEVAL EST PAT 65+ YR: CPT | Performed by: INTERNAL MEDICINE

## 2025-01-27 PROCEDURE — 1126F AMNT PAIN NOTED NONE PRSNT: CPT | Performed by: INTERNAL MEDICINE

## 2025-01-27 NOTE — ASSESSMENT & PLAN NOTE
Mixed hyperlipidemia on simvastatin 20 mg daily denies myalgia arthralgia lipid lab of January 24 shows cholesterol 200 triglycerides 72 HDL 90 and LDL 97 continue therapy  CMP was normal with normal liver function

## 2025-01-27 NOTE — ASSESSMENT & PLAN NOTE
Cologuard ordered   Patient has anticipated discharge for 3/6/19. Should follow up about 1-2 weeks after discharge from [facility]. Will be set up with home care services through [place].Thanks!

## 2025-01-27 NOTE — ASSESSMENT & PLAN NOTE
Essential hypertension with initial blood pressure 146/88 heart rate of 58 O2 saturation 98% on lisinopril 30 mg daily denies headache or cough  CMP of January 24 is normal  Last EKG of January 2024 normal  Continue therapy

## 2025-01-27 NOTE — PROGRESS NOTES
Subjective   The ABCs of the Annual Wellness Visit  Medicare Wellness Visit      Lucia Mc is a 79 y.o. patient who presents for a Medicare Wellness Visit.    The following portions of the patient's history were reviewed and   updated as appropriate: allergies, current medications, past family history, past medical history, past social history, and past surgical history.    Compared to one year ago, the patient's physical   health is the same.  Compared to one year ago, the patient's mental   health is the same.    Recent Hospitalizations:  She was not admitted to the hospital during the last year.     Current Medical Providers:  Patient Care Team:  Wei Skelton MD as PCP - General (Internal Medicine)  Wei Skelton MD as PCP - Family Medicine  Reginald Dvoe MD as Consulting Physician (Ophthalmology)  Sammy Delvalle MD as Consulting Physician (Gastroenterology)  Becca Rico MD as Consulting Physician (Dermatology)    Outpatient Medications Prior to Visit   Medication Sig Dispense Refill    cholecalciferol (VITAMIN D3) 1000 units tablet Take 1 tablet by mouth Daily.      lisinopril (PRINIVIL,ZESTRIL) 30 MG tablet Take 1 tablet by mouth Daily. 90 tablet 3    Multiple Vitamins-Minerals (MULTIVITAMIN WITH MINERALS) tablet tablet Take 1 tablet by mouth Daily.      Probiotic Product (PROBIOTIC-10) capsule Take  by mouth Daily.      simvastatin (ZOCOR) 20 MG tablet TAKE 1 TABLET EVERY EVENING 90 tablet 3    Wheat Dextrin (Benefiber) powder Take 1 each by mouth Daily.      temazepam (RESTORIL) 30 MG capsule Take 1 capsule by mouth At Night As Needed for Sleep. (Patient not taking: Reported on 1/27/2025)       No facility-administered medications prior to visit.     No opioid medication identified on active medication list. I have reviewed chart for other potential  high risk medication/s and harmful drug interactions in the elderly.      Aspirin is not on active medication list.  Aspirin use is not  "indicated based on review of current medical condition/s. Risk of harm outweighs potential benefits.  .    Patient Active Problem List   Diagnosis    Insomnia, unspecified    Post-menopausal    Screening mammogram, encounter for    Osteoporosis    Hyperlipidemia    Primary osteoarthritis involving multiple joints    Early cataract    Essential hypertension    Vitamin D deficiency    Left ear impacted cerumen    Osteopenia    Medicare annual wellness visit, subsequent    Polyp of colon    Alcohol use    Kyphosis, acquired    Histoplasmosis    Lower abdominal pain    Irritable bowel syndrome    Cataract of left eye    Acute midline thoracic back pain    Stress    Medicare annual wellness visit, subsequent    COVID-19 virus infection    Flu    Medicare annual wellness visit, subsequent    Medicare annual wellness visit, subsequent    Numbness and tingling in left hand    Medicare annual wellness visit, subsequent     Advance Care Planning {Advance Care Planning Hyperlink:23}Advance Directive is on file.  ACP discussion was held with the patient during this visit. Patient has an advance directive in EMR which is still valid.             Objective   Vitals:    01/27/25 1236   BP: 146/88   BP Location: Left arm   Patient Position: Sitting   Cuff Size: Adult   Pulse: 58   Temp: 98 °F (36.7 °C)   TempSrc: Infrared   SpO2: 98%   Weight: 63 kg (138 lb 12.8 oz)   Height: 162.6 cm (64.02\")   PainSc: 0-No pain       Estimated body mass index is 23.81 kg/m² as calculated from the following:    Height as of this encounter: 162.6 cm (64.02\").    Weight as of this encounter: 63 kg (138 lb 12.8 oz).    BMI is within normal parameters. No other follow-up for BMI required.    {Help Text;  If you change Medicare Wellness reason for visit to a Welcome to Medicare reason for visit after the encounter has started, please add SmartPhrase .GAITBALANCE to your note for proper gait and balance documentation. This text will disappear after the " note is signed:75681}       Does the patient have evidence of cognitive impairment? No  Lab Results   Component Value Date    TRIG 72 2025    HDL 90 (H) 2025    LDL 97 2025    VLDL 13 2025                                                                                                Health  Risk Assessment    Smoking Status:  Social History     Tobacco Use   Smoking Status Former    Current packs/day: 0.00    Average packs/day: 0.3 packs/day for 1 year (0.3 ttl pk-yrs)    Types: Cigarettes    Start date: 1963    Quit date: 1964    Years since quittin.1   Smokeless Tobacco Never   Tobacco Comments    1 CIGARETTES       Alcohol Consumption:  Social History     Substance and Sexual Activity   Alcohol Use Yes    Alcohol/week: 7.0 standard drinks of alcohol    Types: 7 Glasses of wine per week       Fall Risk Screen{Jump to Steadi Fall Risk Flowsheet:23}  STEADI Fall Risk Assessment was completed, and patient is at LOW risk for falls.Assessment completed on:2025    Depression Screening{Jump to PHQ SmartForm:23}   Little interest or pleasure in doing things? Not at all   Feeling down, depressed, or hopeless? Not at all   PHQ-2 Total Score 0      Health Habits and Functional and Cognitive Screenin/27/2025    12:35 PM   Functional & Cognitive Status   Do you have difficulty preparing food and eating? No   Do you have difficulty bathing yourself, getting dressed or grooming yourself? No   Do you have difficulty using the toilet? No   Do you have difficulty moving around from place to place? No   Do you have trouble with steps or getting out of a bed or a chair? No   Current Diet Well Balanced Diet   Dental Exam Up to date   Eye Exam Up to date   Exercise (times per week) 3 times per week   Current Exercises Include Walking   Do you need help using the phone?  No   Are you deaf or do you have serious difficulty hearing?  No   Do you need help to go to places out of walking  distance? No   Do you need help shopping? No   Do you need help preparing meals?  No   Do you need help with housework?  No   Do you need help with laundry? No   Do you need help taking your medications? No   Do you need help managing money? No   Do you ever drive or ride in a car without wearing a seat belt? No   Have you felt unusual stress, anger or loneliness in the last month? No   Who do you live with? Alone   If you need help, do you have trouble finding someone available to you? No   Have you been bothered in the last four weeks by sexual problems? No   Do you have difficulty concentrating, remembering or making decisions? No           Age-appropriate Screening Schedule:  Refer to the list below for future screening recommendations based on patient's age, sex and/or medical conditions. Orders for these recommended tests are listed in the plan section. The patient has been provided with a written plan.    Health Maintenance List  Health Maintenance   Topic Date Due    ZOSTER VACCINE (2 of 3) 10/10/2016    RSV Vaccine - Adults (1 - 1-dose 75+ series) Never done    DXA SCAN  05/13/2023    COLORECTAL CANCER SCREENING  06/17/2024    INFLUENZA VACCINE  07/01/2024    COVID-19 Vaccine (4 - 2024-25 season) 09/01/2024    TDAP/TD VACCINES (2 - Td or Tdap) 04/20/2025    LIPID PANEL  01/24/2026    ANNUAL WELLNESS VISIT  01/27/2026    Pneumococcal Vaccine 65+  Completed    MAMMOGRAM  Discontinued                                                                                                                                                CMS Preventative Services Quick Reference  Risk Factors Identified During Encounter  Vision Screening Recommended    The above risks/problems have been discussed with the patient.  Pertinent information has been shared with the patient in the After Visit Summary.  An After Visit Summary and PPPS were made available to the patient.    Follow Up:{Wrapup  Review (Popup)  Advance Care  Planning  Labs  CC  Problem List  Visit Diagnosis  Medications  Result Review  Imaging  [facility]  BestPractice  SmartSets  SnapShot  Encounters  Notes  Media  Procedures :23}   Next Medicare Wellness visit to be scheduled in 1 year.         Additional E&M Note during same encounter follows:  Patient has additional, significant, and separately identifiable condition(s)/problem(s) that require work above and beyond the Medicare Wellness Visit     Chief Complaint  Medicare Wellness-subsequent, Hyperlipidemia, and Hypertension    Subjective {Problem List  Visit Diagnosis   Encounters  Notes  Medications  Labs  Result Review Imaging  Media :23} {Help Text;  If performing a Preventative Medicine Visit (e.g. 51920) in addition to AWV, choose the 1st SmartList option below and document any ROS/PE performed.  If performing a separately identifiable E/M service (e.g. 20566), choose 2nd SmartLink option below. This information in red will not appear in the final note after note is signed:68810}  KYLE Dia is also being seen today for an annual adult preventative physical exam.        The patient is an 79-year-old female who presents for a Medicare annual wellness visit and physical examination with essential hypertension, hyperlipidemia, degenerative arthritis disease, and vitamin D deficiency on therapy.    She reports a weight loss, with her current weight being 138 pounds. She has not reviewed her recent lab results. She is not experiencing any chest pain. Her last colonoscopy was conducted in 06/2019, and she was advised to repeat the procedure in 5 years. She expresses reluctance towards undergoing another colonoscopy. She continues to add Benefiber to her morning coffee and maintains a balanced diet. She does not experience headaches, dizziness, allergy congestion, heart racing, skipping, palpitations, upset stomach, nausea, or vomiting. She reports no respiratory issues such  "as coughing or wheezing. She has sufficient medication until her return from [place]. She does not experience any foot pain or neuropathy.    She occasionally takes melatonin for sleep, although she does not believe she requires excessive sleep. Her sleep pattern typically involves 4 hours of sleep, followed by a brief period of wakefulness, after which she returns to sleep. She usually wakes up around 5:30 or 6:00 in the morning. Due to fatigue at night, she tends to retire early, around 10 or 11 PM.    She reports no issues with her vision but acknowledges the need for a follow-up with her ophthalmologist regarding her cataract. She has undergone cataract surgery in her left eye and has been informed that her condition is stable. She has limited her driving at night.    She visits her dermatologist biannually and reports no new skin conditions. She maintains good oral hygiene, with dental cleanings scheduled 4 times a year due to gum sensitivity. She recently underwent a laser treatment for her gingival tissue and was advised to use an electric toothbrush.    She experiences arthritis in her left hand, which causes difficulty in tasks such as unscrewing jars or bottle caps. She does not experience any issues with her thumbs. She engages in regular walking exercises and plans to resume her Silver Sneakers program.    She uses a pad daily for bladder control. She reports no issues with her hips, knees, or ankles.    MEDICATIONS  Current: melatonin          Objective   Vital Signs:  /88 (BP Location: Left arm, Patient Position: Sitting, Cuff Size: Adult)   Pulse 58   Temp 98 °F (36.7 °C) (Infrared)   Ht 162.6 cm (64.02\")   Wt 63 kg (138 lb 12.8 oz)   SpO2 98%   BMI 23.81 kg/m²   Physical Exam      Eyes, ears, nose, and throat appear normal. Sinuses are normal.  Neck appears normal. No lumps, bumps, or thyroid enlargement detected. No carotid blockage heard.  Lungs are clear.  Heart exhibits a regular " rhythm. No abnormal sounds heard.  Abdomen appears normal. No enlarged liver or spleen detected.  Joints exhibit excellent movement. Feet and ankles have very good circulation. Feet are warm.  Speech is normal.    Vital Signs  Blood pressure measures 128/70. Weight is 138 pounds.    {The following data was reviewed by (Optional):11347}  {Data reviewed (Optional):31084:::1}  CMP          1/24/2025    10:41   CMP   Glucose 81    BUN 10    Creatinine 0.81    EGFR 73.5    Sodium 140    Potassium 4.4    Chloride 101    Calcium 9.8    Total Protein 6.9    Albumin 4.3    Globulin 2.6    Total Bilirubin 0.6    Alkaline Phosphatase 104    AST (SGOT) 25    ALT (SGPT) 18    Albumin/Globulin Ratio 1.7    BUN/Creatinine Ratio 12.3    Anion Gap 11.0      CBC          1/24/2025    10:41   CBC   WBC 4.99    RBC 4.45    Hemoglobin 13.3    Hematocrit 41.1    MCV 92.4    MCH 29.9    MCHC 32.4    RDW 12.3    Platelets 216      Lipid Panel          1/24/2025    10:41   Lipid Panel   Total Cholesterol 200    Triglycerides 72    HDL Cholesterol 90    VLDL Cholesterol 13    LDL Cholesterol  97    LDL/HDL Ratio 1.06      TSH          1/24/2025    10:41   TSH   TSH 1.900      Electrolytes          1/24/2025    10:41   Electrolytes   Sodium 140    Potassium 4.4    Chloride 101    Calcium 9.8      Renal Profile          1/24/2025    10:41   Renal Profile   BUN 10    Creatinine 0.81        Results  Laboratory Studies  Vitamin D level was normal at 41.8. Thyroid test was normal. Total cholesterol was 200, LDL was 97, HDL was 90. Blood sugar was normal. Kidney function was normal. Electrolytes, body salt, sodium test, and chloride were normal. Liver tests were normal. CBC showed no anemia, everything looked normal.              Assessment and Plan {CC Problem List  Visit Diagnosis   ROS  Review (Popup)  Health Maintenance  Quality  BestPractice  Medications  SmartSets  SnapShot Encounters  Media :23}{Help Text; When performing an  adult Preventative Medicine Visit (e.g. 34992) using the optional SmartList below can help when documenting any age-appropriate advice given.  When using the SmartList review and update the information based on the unique discussion or advice given to the patient.  As a reminder, diagnosis code Z00.00 (nl exam) or Z00.01 (abnl exam), should be added when this service is performed.  Text will disappear once the note is signed:68653}Additional age appropriate preventative wellness advice topics were discussed during today's preventative wellness exam(some topics already addressed during AWV portion of the note above):    Physical Activity: Advised cardiovascular activity 150 minutes per week as tolerated. (example brisk walk for 30 minutes, 5 days a week).     Nutrition: Discussed nutrition plan with patient. Information shared in after visit summary. Goal is for a well balanced diet to enhance overall health.     Healthy Weight: Discussed current and goal BMI with patient. Steps to attain this goal discussed. Information shared in after visit summary.       1. Medicare annual wellness visit.  Her weight has remained stable since August 2024. Vitamin D levels are within the normal range at 41.8. Thyroid function tests are normal. Lipid profile shows a total cholesterol of 200, LDL of 97, and HDL of 90, indicating a protective effect. Blood glucose levels are normal. Renal function, electrolyte balance, and liver function tests are all within normal limits. Complete blood count (CBC) is normal, with no signs of anemia. Previous electrocardiograms (EKGs) have been normal. Last colonoscopy was performed in June 2019, revealing diverticula but no polyps. Mammogram was last conducted on June 1, 2024. A Cologuard test will be ordered for colorectal cancer screening. She is advised to schedule her next mammogram.    2. Insomnia.  She is advised to take Tylenol PM at bedtime to help with sleep. If needed, she can also take 5  mg of melatonin along with Tylenol PM.    3. Cataracts.  She reports that her vision is stable, but she will follow up with her ophthalmologist after returning from [place]. She is advised to avoid driving at night due to potential glare from headlights.    4. Degenerative arthritis.  She reports difficulty with tasks such as unscrewing jars and bottle caps due to arthritis in her left hand. She is advised to use assistive devices to alleviate strain on her hands.    5. Urinary incontinence.  She wears a pad daily to manage mild urinary incontinence. She is advised to continue this practice and to ensure timely bathroom visits to prevent accidents.    Follow-up  The patient will follow up in 6 months.    PROCEDURE  Colonoscopy was performed in June 2019, revealing diverticula but no polyps.          {Time Spent (Optional):03393}  Follow Up {Instructions Charge Capture  Follow-up Communications :23}  Return in about 6 months (around 7/27/2025).  Patient was given instructions and counseling regarding her condition or for health maintenance advice. Please see specific information pulled into the AVS if appropriate.  {MAR[employer]A [employer] Provider Statement:93190}

## 2025-01-27 NOTE — PROGRESS NOTES
QUICK REFERENCE INFORMATION:  The ABCs of the Annual Wellness Visit    Subsequent Medicare Wellness Visit    HEALTH RISK ASSESSMENT    1945    Recent Hospitalizations:  No hospitalization(s) within the last year..        Current Medical Providers:  Patient Care Team:  Wei Skelton MD as PCP - General (Internal Medicine)  Wei Skelton MD as PCP - Family Medicine  Reginald Dove MD as Consulting Physician (Ophthalmology)  Sammy Delvalle MD as Consulting Physician (Gastroenterology)  Becca Rico MD as Consulting Physician (Dermatology)        Smoking Status:  Social History     Tobacco Use   Smoking Status Former    Current packs/day: 0.00    Average packs/day: 0.3 packs/day for 1 year (0.3 ttl pk-yrs)    Types: Cigarettes    Start date: 1963    Quit date: 1964    Years since quittin.1   Smokeless Tobacco Never   Tobacco Comments    1 CIGARETTES         Alcohol Consumption:  Social History     Substance and Sexual Activity   Alcohol Use Yes    Alcohol/week: 7.0 standard drinks of alcohol    Types: 7 Glasses of wine per week       Depression Screen:       2025    12:35 PM   PHQ-2/PHQ-9 Depression Screening   Little interest or pleasure in doing things Not at all   Feeling down, depressed, or hopeless Not at all   How difficult have these problems made it for you to do your work, take care of things at home, or get along with other people? Not difficult at all       Health Habits and Functional and Cognitive Screenin/27/2025    12:35 PM   Functional & Cognitive Status   Do you have difficulty preparing food and eating? No   Do you have difficulty bathing yourself, getting dressed or grooming yourself? No   Do you have difficulty using the toilet? No   Do you have difficulty moving around from place to place? No   Do you have trouble with steps or getting out of a bed or a chair? No   Current Diet Well Balanced Diet   Dental Exam Up to date   Eye Exam Up to date   Exercise  "(times per week) 3 times per week   Current Exercises Include Walking   Do you need help using the phone?  No   Are you deaf or do you have serious difficulty hearing?  No   Do you need help to go to places out of walking distance? No   Do you need help shopping? No   Do you need help preparing meals?  No   Do you need help with housework?  No   Do you need help with laundry? No   Do you need help taking your medications? No   Do you need help managing money? No   Do you ever drive or ride in a car without wearing a seat belt? No   Have you felt unusual stress, anger or loneliness in the last month? No   Who do you live with? Alone   If you need help, do you have trouble finding someone available to you? No   Have you been bothered in the last four weeks by sexual problems? No   Do you have difficulty concentrating, remembering or making decisions? No       Fall Risk Screen:  ANISA Fall Risk Assessment was completed, and patient is at LOW risk for falls.Assessment completed on:1/27/2025    ACE III MINI        Does the patient have evidence of cognitive impairment? No    Aspirin use counseling: Does not need ASA (and currently is not on it)    Recent Lab Results:  CMP:  Lab Results   Component Value Date    Glucose 81 01/24/2025    BUN 10 01/24/2025    BUN/Creatinine Ratio 12.3 01/24/2025    Creatinine 0.81 01/24/2025    Creatinine, Urine 76.8 11/11/2020    Creatinine, Urine 80.4 11/11/2020    CO2 28.0 01/24/2025    Calcium 9.8 01/24/2025    Albumin 4.3 01/24/2025    AST (SGOT) 25 01/24/2025    ALT (SGPT) 18 01/24/2025     HbA1c:  No results found for: \"HGBA1C\"  Microalbumin:  Lab Results   Component Value Date    MICROALBUR 12.1 11/11/2020     Lipid Panel  Lab Results   Component Value Date    CHOL 200 01/24/2025    TRIG 72 01/24/2025    HDL 90 (H) 01/24/2025    LDL 97 01/24/2025    AST 25 01/24/2025    ALT 18 01/24/2025       Visual Acuity:  No results found.    Age-appropriate Screening Schedule:  Refer to the " list below for future screening recommendations based on patient's age, sex and/or medical conditions. Orders for these recommended tests are listed in the plan section. The patient has been provided with a written plan.    Health Maintenance   Topic Date Due    ZOSTER VACCINE (2 of 3) 10/10/2016    RSV Vaccine - Adults (1 - 1-dose 75+ series) Never done    DXA SCAN  05/13/2023    COLORECTAL CANCER SCREENING  06/17/2024    INFLUENZA VACCINE  03/31/2025 (Originally 7/1/2024)    COVID-19 Vaccine (4 - 2024-25 season) 01/01/2026 (Originally 9/1/2024)    TDAP/TD VACCINES (2 - Td or Tdap) 04/20/2025    LIPID PANEL  01/24/2026    ANNUAL WELLNESS VISIT  01/27/2026    Pneumococcal Vaccine 65+  Completed    MAMMOGRAM  Discontinued        Results  Laboratory Studies  Vitamin D level was normal at 41.8. Thyroid test was normal. Total cholesterol was 200, LDL was 97, HDL was 90. Blood sugar was normal. Kidney function was normal. Electrolytes, body salt, sodium test, and chloride were normal. Liver tests were normal. CBC showed no anemia, everything looked normal.    Subjective   History of Present Illness  The patient is an 79-year-old female who presents for a Medicare annual wellness visit and physical examination with essential hypertension, hyperlipidemia, degenerative arthritis disease, and vitamin D deficiency on therapy.    She reports a weight loss, with her current weight being 138 pounds. She has not reviewed her recent lab results. She is not experiencing any chest pain. Her last colonoscopy was conducted in 06/2019, and she was advised to repeat the procedure in 5 years. She expresses reluctance towards undergoing another colonoscopy. She continues to add Benefiber to her morning coffee and maintains a balanced diet. She does not experience headaches, dizziness, allergy congestion, heart racing, skipping, palpitations, upset stomach, nausea, or vomiting. She reports no respiratory issues such as coughing or  wheezing. She has sufficient medication until her return from [place]. She does not experience any foot pain or neuropathy.    She occasionally takes melatonin for sleep, although she does not believe she requires excessive sleep. Her sleep pattern typically involves 4 hours of sleep, followed by a brief period of wakefulness, after which she returns to sleep. She usually wakes up around 5:30 or 6:00 in the morning. Due to fatigue at night, she tends to retire early, around 10 or 11 PM.    She reports no issues with her vision but acknowledges the need for a follow-up with her ophthalmologist regarding her cataract. She has undergone cataract surgery in her left eye and has been informed that her condition is stable. She has limited her driving at night.    She visits her dermatologist biannually and reports no new skin conditions. She maintains good oral hygiene, with dental cleanings scheduled 4 times a year due to gum sensitivity. She recently underwent a laser treatment for her gingival tissue and was advised to use an electric toothbrush.    She experiences arthritis in her left hand, which causes difficulty in tasks such as unscrewing jars or bottle caps. She does not experience any issues with her thumbs. She engages in regular walking exercises and plans to resume her [employer] program.    She uses a pad daily for bladder control. She reports no issues with her hips, knees, or ankles.    MEDICATIONS  Current: melatonin    Lucia Mc is a 79 y.o. female who presents for a Subsequent Wellness Visit.    CHRONIC CONDITIONS    The following portions of the patient's history were reviewed and updated as appropriate: allergies, current medications, past family history, past medical history, past social history, and past surgical history.    Outpatient Medications Prior to Visit   Medication Sig Dispense Refill    cholecalciferol (VITAMIN D3) 1000 units tablet Take 1 tablet by mouth Daily.       lisinopril (PRINIVIL,ZESTRIL) 30 MG tablet Take 1 tablet by mouth Daily. 90 tablet 3    Multiple Vitamins-Minerals (MULTIVITAMIN WITH MINERALS) tablet tablet Take 1 tablet by mouth Daily.      Probiotic Product (PROBIOTIC-10) capsule Take  by mouth Daily.      simvastatin (ZOCOR) 20 MG tablet TAKE 1 TABLET EVERY EVENING 90 tablet 3    Wheat Dextrin (Benefiber) powder Take 1 each by mouth Daily.      temazepam (RESTORIL) 30 MG capsule Take 1 capsule by mouth At Night As Needed for Sleep. (Patient not taking: Reported on 1/27/2025)       No facility-administered medications prior to visit.       Patient Active Problem List   Diagnosis    Insomnia, unspecified    Post-menopausal    Screening mammogram, encounter for    Osteoporosis    Hyperlipidemia    Primary osteoarthritis involving multiple joints    Early cataract    Essential hypertension    Vitamin D deficiency    Left ear impacted cerumen    Osteopenia    Medicare annual wellness visit, subsequent    Polyp of colon    Alcohol use    Kyphosis, acquired    Histoplasmosis    Lower abdominal pain    Irritable bowel syndrome    Cataract of left eye    Acute midline thoracic back pain    Stress    Medicare annual wellness visit, subsequent    COVID-19 virus infection    Flu    Medicare annual wellness visit, subsequent    Medicare annual wellness visit, subsequent    Numbness and tingling in left hand    Medicare annual wellness visit, subsequent       Advance Care Planning:  ACP discussion was held with the patient during this visit. Patient has an advance directive in EMR which is still valid.       Identification of Risk Factors:  Risk factors include: Breast Cancer/Mammogram Screening  Cardiovascular risk  Colon Cancer Screening.    Review of Systems   Constitutional: Negative.    HENT: Negative.     Eyes:  Positive for visual disturbance.        Remote cataract left eye removed with immature cataract right eye   Respiratory: Negative.     Cardiovascular:  Negative.    Gastrointestinal: Negative.         Last colonoscopy was June 17, 2019 showing diverticula with suggested repeat in 5 years  We will order Cologuard   Endocrine: Negative.    Genitourinary:         Stress incontinence wearing depends pad 1/day   Musculoskeletal:  Positive for arthralgias.        Mild arthritis of the hands left greater than right and mild knee arthritic changes   Skin: Negative.    Allergic/Immunologic: Negative.    Neurological: Negative.    Hematological: Negative.    Psychiatric/Behavioral: Negative.       Physical Exam  Eyes, ears, nose, and throat appear normal. Sinuses are normal.  Neck appears normal. No lumps, bumps, or thyroid enlargement detected. No carotid blockage heard.  Lungs are clear.  Heart exhibits a regular rhythm. No abnormal sounds heard.  Abdomen appears normal. No enlarged liver or spleen detected.  Joints exhibit excellent movement. Feet and ankles have very good circulation. Feet are warm.  Speech is normal.    Vital Signs  Blood pressure measures 128/70. Weight is 138 pounds.    Compared to one year ago, the patient feels her physical health is the same.  Compared to one year ago, the patient feels her mental health is the same.    Objective     Physical Exam  Constitutional:       Appearance: She is normal weight.   HENT:      Head: Normocephalic.      Right Ear: Tympanic membrane and ear canal normal.      Left Ear: Tympanic membrane and ear canal normal.      Nose: Nose normal.      Mouth/Throat:      Mouth: Mucous membranes are moist.      Pharynx: Oropharynx is clear.   Eyes:      Extraocular Movements: Extraocular movements intact.      Conjunctiva/sclera: Conjunctivae normal.      Pupils: Pupils are equal, round, and reactive to light.      Comments: Cataract has been removed from the left eye   Cardiovascular:      Rate and Rhythm: Normal rate and regular rhythm.      Pulses: Normal pulses.   Pulmonary:      Effort: Pulmonary effort is normal.       "Breath sounds: Normal breath sounds.   Abdominal:      General: Abdomen is flat. Bowel sounds are normal.      Palpations: Abdomen is soft.   Musculoskeletal:      Cervical back: Normal range of motion and neck supple.      Comments: Arthritis of the left greater than right hand and knee bilateral mild   Skin:     General: Skin is warm and dry.      Capillary Refill: Capillary refill takes less than 2 seconds.   Neurological:      General: No focal deficit present.      Mental Status: She is alert and oriented to person, place, and time. Mental status is at baseline.   Psychiatric:         Mood and Affect: Mood normal.          Procedures patient's last EKG was January 25, 2024-normal not repeated    Patient Wellness Counseling:   Plan of care reviewed with patient at the conclusion of today's visit. Education was provided in regards to diagnosis, diet and exercise, cervical cancer screening, self breast exams, breast cancer screening, and the importance of yearly mammograms.   Nutrition, family planning/contraception, physical activity, healthy weight,ways to reduce stress, adequate sleep, injury prevention, misuse of tobacco, alcohol and drugs, sexual behavior and STD's, dental health, mental health, and immunizations.    Management and any prescribed or recommended OTC medications.  Patient verbalizes understanding of and agreement with management plan.    Vitals:    01/27/25 1236   BP: 146/88   BP Location: Left arm   Patient Position: Sitting   Cuff Size: Adult   Pulse: 58   Temp: 98 °F (36.7 °C)   TempSrc: Infrared   SpO2: 98%   Weight: 63 kg (138 lb 12.8 oz)   Height: 162.6 cm (64.02\")   PainSc: 0-No pain       BMI is within normal parameters. No other follow-up for BMI required.      Assessment & Plan   Assessment & Plan  1. Medicare annual wellness visit.  Her weight has remained stable since August 2024. Vitamin D levels are within the normal range at 41.8. Thyroid function tests are normal. Lipid profile " shows a total cholesterol of 200, LDL of 97, and HDL of 90, indicating a protective effect. Blood glucose levels are normal. Renal function, electrolyte balance, and liver function tests are all within normal limits. Complete blood count (CBC) is normal, with no signs of anemia. Previous electrocardiograms (EKGs) have been normal. Last colonoscopy was performed in June 2019, revealing diverticula but no polyps. Mammogram was last conducted on June 1, 2024. A Cologuard test will be ordered for colorectal cancer screening. She is advised to schedule her next mammogram.    2. Insomnia.  She is advised to take Tylenol PM at bedtime to help with sleep. If needed, she can also take 5 mg of melatonin along with Tylenol PM.    3. Cataracts.  She reports that her vision is stable, but she will follow up with her ophthalmologist after returning from [place]. She is advised to avoid driving at night due to potential glare from headlights.    4. Degenerative arthritis.  She reports difficulty with tasks such as unscrewing jars and bottle caps due to arthritis in her left hand. She is advised to use assistive devices to alleviate strain on her hands.    5. Urinary incontinence.  She wears a pad daily to manage mild urinary incontinence. She is advised to continue this practice and to ensure timely bathroom visits to prevent accidents.    Follow-up  The patient will follow up in 6 months.    PROCEDURE  Colonoscopy was performed in June 2019, revealing diverticula but no polyps.    Problem List Items Addressed This Visit          Cardiac and Vasculature    Hyperlipidemia    Overview     History of mixed hyperlipidemia on simvastatin 20 mg daily no history of myalgia arthralgia          Current Assessment & Plan      Mixed hyperlipidemia on simvastatin 20 mg daily denies myalgia arthralgia lipid lab of January 24 shows cholesterol 200 triglycerides 72 HDL 90 and LDL 97 continue therapy  CMP was normal with normal liver function          Relevant Medications    simvastatin (ZOCOR) 20 MG tablet    Essential hypertension    Overview     History of essential hypertension with current blood pressure 122/80 on lisinopril 20 mg daily denies headache or cough blood pressure stable EKG is normal          Current Assessment & Plan     Essential hypertension with initial blood pressure 146/88 heart rate of 58 O2 saturation 98% on lisinopril 30 mg daily denies headache or cough  CMP of January 24 is normal  Last EKG of January 2024 normal  Continue therapy         Relevant Medications    lisinopril (PRINIVIL,ZESTRIL) 30 MG tablet       Endocrine and Metabolic    Vitamin D deficiency    Overview     History of vitamin D deficiency on vitamin D3 1000 units daily and multiple vitamins with current level normal at 35.5 suggest continue therapy.         Current Assessment & Plan     Vitamin D deficiency on 1000 units daily with recent vitamin D level of 41.8 on January 24, 2025            Eye    Cataract of left eye    Overview     Remote left cataract surgery  Right eye is immature cataract            Gastrointestinal Abdominal     Polyp of colon    Overview     Last colonoscopy was June 17, 2019 showing diverticuli with suggested repeat in 5 years  Patient would prefer Cologuard and we will order         Current Assessment & Plan     Cologuard ordered            Health Encounters    Medicare annual wellness visit, subsequent - Primary    Overview     79-year-old female presents for Medicare and wellness visit and physical examination on January 27, 2025            Musculoskeletal and Injuries    Primary osteoarthritis involving multiple joints    Overview     Multi-joint knowledgeable arthritis disease with back pain and and bilateral knee discomfort overall doing well PRN NSAID therapy or Tylenol are beneficial         Current Assessment & Plan     Mild degenerative arthritis disease primarily of the left greater than right hand with some loss of strength  and deformity with very mild knee arthritic changes Tylenol as needed is beneficial and active lifestyle is recommended          Other Visit Diagnoses       Colon cancer screening        Relevant Orders    Cologuard - Stool, Per Rectum          Patient Self-Management and Personalized Health Advice  The patient has been provided with information about: diet, exercise, and prevention of cardiac or vascular disease and preventive services including:   Annual Wellness Visit (AWV).    Outpatient Encounter Medications as of 1/27/2025   Medication Sig Dispense Refill    cholecalciferol (VITAMIN D3) 1000 units tablet Take 1 tablet by mouth Daily.      lisinopril (PRINIVIL,ZESTRIL) 30 MG tablet Take 1 tablet by mouth Daily. 90 tablet 3    Multiple Vitamins-Minerals (MULTIVITAMIN WITH MINERALS) tablet tablet Take 1 tablet by mouth Daily.      Probiotic Product (PROBIOTIC-10) capsule Take  by mouth Daily.      simvastatin (ZOCOR) 20 MG tablet TAKE 1 TABLET EVERY EVENING 90 tablet 3    Wheat Dextrin (Benefiber) powder Take 1 each by mouth Daily.      temazepam (RESTORIL) 30 MG capsule Take 1 capsule by mouth At Night As Needed for Sleep. (Patient not taking: Reported on 1/27/2025)       No facility-administered encounter medications on file as of 1/27/2025.       Reviewed use of high risk medication in the elderly: yes  No opioid medication is identified in the patient's active medication list  Reviewed for potential of harmful drug interactions in the elderly: yes    Follow Up:  Return in about 6 months (around 7/27/2025).     Patient Instructions   Past 11 January 2024 discussed  No EKG performed last EKG  was January 2024 and normal  Continue current medication  Continue active ADL and physical exercise  Continue healthy cardiac diet  Order Cologuard screening  Return visit in 6 months or as needed    An After Visit Summary and PPPS with all of these plans were given to the patient.         01/27/25   13:31 EST     Patient  or patient representative verbalized consent for the use of Ambient Listening during the visit with  Wei Skelton MD for chart documentation. 1/27/2025  16:54 EST

## 2025-01-27 NOTE — ASSESSMENT & PLAN NOTE
Mild degenerative arthritis disease primarily of the left greater than right hand with some loss of strength and deformity with very mild knee arthritic changes Tylenol as needed is beneficial and active lifestyle is recommended

## 2025-01-27 NOTE — PATIENT INSTRUCTIONS
Past 11 January 2024 discussed  No EKG performed last EKG  was January 2024 and normal  Continue current medication  Continue active ADL and physical exercise  Continue healthy cardiac diet  Order Cologuard screening  Return visit in 6 months or as needed

## 2025-01-28 ENCOUNTER — TRANSCRIBE ORDERS (OUTPATIENT)
Dept: ADMINISTRATIVE | Facility: HOSPITAL | Age: 80
End: 2025-01-28
Payer: MEDICARE

## 2025-01-28 DIAGNOSIS — Z12.31 VISIT FOR SCREENING MAMMOGRAM: Primary | ICD-10-CM

## 2025-02-25 RX ORDER — LISINOPRIL 30 MG/1
30 TABLET ORAL DAILY
Qty: 90 TABLET | Refills: 3 | Status: SHIPPED | OUTPATIENT
Start: 2025-02-25

## 2025-02-25 NOTE — TELEPHONE ENCOUNTER
Rx Refill Note  Requested Prescriptions     Pending Prescriptions Disp Refills    lisinopril (PRINIVIL,ZESTRIL) 30 MG tablet 90 tablet 3     Sig: Take 1 tablet by mouth Daily.      Last office visit with prescribing clinician: 1/27/2025   Last telemedicine visit with prescribing clinician: Visit date not found   Next office visit with prescribing clinician: 7/21/2025                         Would you like a call back once the refill request has been completed: [] Yes [] No    If the office needs to give you a call back, can they leave a voicemail: [] Yes [] No    Silvia Naranjo LPN  02/25/25, 08:57 EST

## 2025-04-03 ENCOUNTER — TELEPHONE (OUTPATIENT)
Dept: INTERNAL MEDICINE | Facility: CLINIC | Age: 80
End: 2025-04-03
Payer: MEDICARE

## 2025-04-03 DIAGNOSIS — Z12.11 COLON CANCER SCREENING: ICD-10-CM

## 2025-04-03 RX ORDER — SIMVASTATIN 20 MG
20 TABLET ORAL EVERY EVENING
Qty: 90 TABLET | Refills: 3 | Status: SHIPPED | OUTPATIENT
Start: 2025-04-03

## 2025-04-03 NOTE — TELEPHONE ENCOUNTER
"    Caller: Lucia Mc W \"Ifeoma\"    Relationship: Self    Best call back number:   Telephone Information:   Mobile [phone]       Requested Prescriptions:   Requested Prescriptions     Pending Prescriptions Disp Refills    simvastatin (ZOCOR) 20 MG tablet 90 tablet 3     Sig: Take 1 tablet by mouth Every Evening.        Pharmacy where request should be sent: [facility] [id number] [address] - [phone] [facility] [phone]      Last office visit with prescribing clinician: 1/27/2025   Last telemedicine visit with prescribing clinician: Visit date not found   Next office visit with prescribing clinician: 7/21/2025       Does the patient have less than a 3 day supply:  [] Yes  [x] No      Vincenzo Severino Rep   04/03/25 14:59 EDT   "

## 2025-04-03 NOTE — TELEPHONE ENCOUNTER
"  Caller: Lucia Mc \"Ifeoma\"    Relationship: Self    Best call back number:   Telephone Information:   Mobile [phone]       What is the best time to reach you: ANY    Who are you requesting to speak with (clinical staff, provider,  specific staff member): CLINICAL    What was the call regarding: PATIENT HAS BEEN WAITING TO HEAR ABOUT A COLOGARD TEST SINCE HER LAST APPOINTMENT, BUT IT HAS NOT BEEN RECEIVED AND SHE HAS NOT HEARD ANYTHING. PATIENT WOULD LIKE A CALL BACK TO SEE WHAT NEEDS TO BE DONE TO GET IT STARTED.  "

## 2025-04-03 NOTE — TELEPHONE ENCOUNTER
"Patient notified that the order had not been \"released\" on our end.  This is done.  Elyssa will contact her.    "

## 2025-04-28 LAB — NONINV COLON CA DNA+OCC BLD SCRN STL QL: NORMAL

## 2025-06-12 LAB — NONINV COLON CA DNA+OCC BLD SCRN STL QL: NORMAL

## 2025-06-26 LAB
NCCN CRITERIA FLAG: NORMAL
TYRER CUZICK SCORE: 1.1

## 2025-07-01 ENCOUNTER — HOSPITAL ENCOUNTER (OUTPATIENT)
Dept: MAMMOGRAPHY | Facility: HOSPITAL | Age: 80
Discharge: HOME | End: 2025-07-01
Admitting: INTERNAL MEDICINE
Payer: MEDICARE

## 2025-07-01 DIAGNOSIS — Z12.31 VISIT FOR SCREENING MAMMOGRAM: ICD-10-CM

## 2025-07-01 PROCEDURE — 77067 SCR MAMMO BI INCL CAD: CPT

## 2025-07-01 PROCEDURE — 77063 BREAST TOMOSYNTHESIS BI: CPT

## 2025-07-08 ENCOUNTER — RESULTS FOLLOW-UP (OUTPATIENT)
Dept: ADMINISTRATIVE | Facility: HOSPITAL | Age: 80
End: 2025-07-08
Payer: MEDICARE

## 2025-07-21 ENCOUNTER — OFFICE VISIT (OUTPATIENT)
Dept: INTERNAL MEDICINE | Facility: CLINIC | Age: 80
End: 2025-07-21
Payer: MEDICARE

## 2025-07-21 ENCOUNTER — LAB (OUTPATIENT)
Dept: LAB | Facility: HOSPITAL | Age: 80
End: 2025-07-21
Payer: MEDICARE

## 2025-07-21 VITALS
DIASTOLIC BLOOD PRESSURE: 88 MMHG | WEIGHT: 134 LBS | SYSTOLIC BLOOD PRESSURE: 158 MMHG | BODY MASS INDEX: 22.88 KG/M2 | HEART RATE: 80 BPM | OXYGEN SATURATION: 98 % | HEIGHT: 64 IN

## 2025-07-21 DIAGNOSIS — E78.2 MIXED HYPERLIPIDEMIA: ICD-10-CM

## 2025-07-21 DIAGNOSIS — I10 ESSENTIAL HYPERTENSION: ICD-10-CM

## 2025-07-21 DIAGNOSIS — E55.9 VITAMIN D DEFICIENCY: ICD-10-CM

## 2025-07-21 DIAGNOSIS — Z12.11 COLON CANCER SCREENING: ICD-10-CM

## 2025-07-21 DIAGNOSIS — R53.83 OTHER FATIGUE: ICD-10-CM

## 2025-07-21 DIAGNOSIS — I10 ESSENTIAL HYPERTENSION: Primary | ICD-10-CM

## 2025-07-21 DIAGNOSIS — N39.46 MIXED STRESS AND URGE URINARY INCONTINENCE: ICD-10-CM

## 2025-07-21 LAB
ALBUMIN SERPL-MCNC: 4.3 G/DL (ref 3.5–5.2)
ALBUMIN/GLOB SERPL: 1.4 G/DL
ALP SERPL-CCNC: 112 U/L (ref 39–117)
ALT SERPL W P-5'-P-CCNC: 16 U/L (ref 1–33)
ANION GAP SERPL CALCULATED.3IONS-SCNC: 12.3 MMOL/L (ref 5–15)
AST SERPL-CCNC: 22 U/L (ref 1–32)
BACTERIA UR QL AUTO: NORMAL /HPF
BASOPHILS # BLD AUTO: 0.05 10*3/MM3 (ref 0–0.2)
BASOPHILS NFR BLD AUTO: 0.9 % (ref 0–1.5)
BILIRUB SERPL-MCNC: 0.5 MG/DL (ref 0–1.2)
BILIRUB UR QL STRIP: NEGATIVE
BUN SERPL-MCNC: 10 MG/DL (ref 8–23)
BUN/CREAT SERPL: 14.3 (ref 7–25)
CALCIUM SPEC-SCNC: 9.9 MG/DL (ref 8.6–10.5)
CHLORIDE SERPL-SCNC: 102 MMOL/L (ref 98–107)
CHOLEST SERPL-MCNC: 168 MG/DL (ref 0–200)
CLARITY UR: CLEAR
CO2 SERPL-SCNC: 25.7 MMOL/L (ref 22–29)
COLOR UR: YELLOW
CREAT SERPL-MCNC: 0.7 MG/DL (ref 0.57–1)
DEPRECATED RDW RBC AUTO: 40.5 FL (ref 37–54)
EGFRCR SERPLBLD CKD-EPI 2021: 87 ML/MIN/1.73
EOSINOPHIL # BLD AUTO: 0.09 10*3/MM3 (ref 0–0.4)
EOSINOPHIL NFR BLD AUTO: 1.6 % (ref 0.3–6.2)
ERYTHROCYTE [DISTWIDTH] IN BLOOD BY AUTOMATED COUNT: 12.4 % (ref 12.3–15.4)
GLOBULIN UR ELPH-MCNC: 3 GM/DL
GLUCOSE SERPL-MCNC: 88 MG/DL (ref 65–99)
GLUCOSE UR STRIP-MCNC: NEGATIVE MG/DL
HCT VFR BLD AUTO: 37.7 % (ref 34–46.6)
HDLC SERPL-MCNC: 59 MG/DL (ref 40–60)
HGB BLD-MCNC: 12.7 G/DL (ref 12–15.9)
HGB UR QL STRIP.AUTO: NEGATIVE
HYALINE CASTS UR QL AUTO: NORMAL /LPF
IMM GRANULOCYTES # BLD AUTO: 0.01 10*3/MM3 (ref 0–0.05)
IMM GRANULOCYTES NFR BLD AUTO: 0.2 % (ref 0–0.5)
KETONES UR QL STRIP: ABNORMAL
LDLC SERPL CALC-MCNC: 96 MG/DL (ref 0–100)
LDLC/HDLC SERPL: 1.62 {RATIO}
LEUKOCYTE ESTERASE UR QL STRIP.AUTO: ABNORMAL
LYMPHOCYTES # BLD AUTO: 2.23 10*3/MM3 (ref 0.7–3.1)
LYMPHOCYTES NFR BLD AUTO: 40.6 % (ref 19.6–45.3)
MCH RBC QN AUTO: 30.7 PG (ref 26.6–33)
MCHC RBC AUTO-ENTMCNC: 33.7 G/DL (ref 31.5–35.7)
MCV RBC AUTO: 91.1 FL (ref 79–97)
MONOCYTES # BLD AUTO: 0.65 10*3/MM3 (ref 0.1–0.9)
MONOCYTES NFR BLD AUTO: 11.8 % (ref 5–12)
NEUTROPHILS NFR BLD AUTO: 2.46 10*3/MM3 (ref 1.7–7)
NEUTROPHILS NFR BLD AUTO: 44.9 % (ref 42.7–76)
NITRITE UR QL STRIP: NEGATIVE
NRBC BLD AUTO-RTO: 0 /100 WBC (ref 0–0.2)
PH UR STRIP.AUTO: 6 [PH] (ref 5–8)
PLATELET # BLD AUTO: 342 10*3/MM3 (ref 140–450)
PMV BLD AUTO: 10.1 FL (ref 6–12)
POTASSIUM SERPL-SCNC: 4.2 MMOL/L (ref 3.5–5.2)
PROT SERPL-MCNC: 7.3 G/DL (ref 6–8.5)
PROT UR QL STRIP: NEGATIVE
RBC # BLD AUTO: 4.14 10*6/MM3 (ref 3.77–5.28)
RBC # UR STRIP: NORMAL /HPF
REF LAB TEST METHOD: NORMAL
SODIUM SERPL-SCNC: 140 MMOL/L (ref 136–145)
SP GR UR STRIP: 1.02 (ref 1–1.03)
SQUAMOUS #/AREA URNS HPF: NORMAL /HPF
TRIGL SERPL-MCNC: 68 MG/DL (ref 0–150)
UROBILINOGEN UR QL STRIP: ABNORMAL
VLDLC SERPL-MCNC: 13 MG/DL (ref 5–40)
WBC # UR STRIP: NORMAL /HPF
WBC NRBC COR # BLD AUTO: 5.49 10*3/MM3 (ref 3.4–10.8)

## 2025-07-21 PROCEDURE — 3079F DIAST BP 80-89 MM HG: CPT | Performed by: INTERNAL MEDICINE

## 2025-07-21 PROCEDURE — 81001 URINALYSIS AUTO W/SCOPE: CPT

## 2025-07-21 PROCEDURE — 80061 LIPID PANEL: CPT

## 2025-07-21 PROCEDURE — 1160F RVW MEDS BY RX/DR IN RCRD: CPT | Performed by: INTERNAL MEDICINE

## 2025-07-21 PROCEDURE — 80053 COMPREHEN METABOLIC PANEL: CPT

## 2025-07-21 PROCEDURE — 1126F AMNT PAIN NOTED NONE PRSNT: CPT | Performed by: INTERNAL MEDICINE

## 2025-07-21 PROCEDURE — 99214 OFFICE O/P EST MOD 30 MIN: CPT | Performed by: INTERNAL MEDICINE

## 2025-07-21 PROCEDURE — 3077F SYST BP >= 140 MM HG: CPT | Performed by: INTERNAL MEDICINE

## 2025-07-21 PROCEDURE — G2211 COMPLEX E/M VISIT ADD ON: HCPCS | Performed by: INTERNAL MEDICINE

## 2025-07-21 PROCEDURE — 1159F MED LIST DOCD IN RCRD: CPT | Performed by: INTERNAL MEDICINE

## 2025-07-21 PROCEDURE — 85025 COMPLETE CBC W/AUTO DIFF WBC: CPT

## 2025-07-21 NOTE — ASSESSMENT & PLAN NOTE
Per lipidemia about simvastatin 20 mg every evening denies myalgia or arthralgia continue therapy  Fasting CMP and lipid are pending

## 2025-07-21 NOTE — PROGRESS NOTES
[facility]     Lucia Mc  1945   [id number]      Patient Care Team:  Wei Skelton MD as PCP - General (Internal Medicine)  Wei Skelton MD as PCP - Family Medicine  Reginald Dove MD as Consulting Physician (Ophthalmology)  Sammy Delvalle MD as Consulting Physician (Gastroenterology)  Becca Rico MD as Consulting Physician (Dermatology)    Chief Complaint::   Chief Complaint   Patient presents with    Hypertension     6 mo follow up.  Hasn't taken lisinopril today    Hyperlipidemia    Fatigue     X 2-3 weeks             HPI  History of Present Illness  The patient is an 80-year-old female who presents to the office for hypertension, hyperlipidemia, vitamin D deficiency, and fatigue for the past 2 to 3 weeks.    She reports feeling slightly unwell over the past few weeks, with a recent episode of stomach upset that has since resolved. She did not take her blood pressure medication this morning before her visit. She returned from a trip to [place] last night and feels somewhat dehydrated. She is not experiencing any headaches, dizziness, lightheadedness, vision changes, or sinus issues. She also reports no respiratory problems such as coughing, wheezing, or shortness of breath, and no chest pain. Her last dose of lisinopril was taken yesterday morning around 10:00 AM.    She mentions a recent gastrointestinal bug that lasted for 4 to 5 days, during which she lost about 6 to 7 pounds. She experienced diarrhea but no constipation, pain, nausea, or vomiting. She did not take any antibiotics for her GI symptoms. She has attempted the Cologuard test twice but was informed that she needs to repeat it. She has a history of colon polyps and has undergone two colonoscopies in 01/2010 and 01/2019.    She uses a pad for minor leakage and has noticed some discharge, leading her to suspect a possible infection. She had an ovarian screening in 05/2025 and does not regularly have Pap smears.  She is not experiencing any discomfort from the discharge and does not need to change her pad frequently. She has not used any over-the-counter products for her symptoms.    She has been trying to schedule an appointment with ophthalmology to check her other cataract as it has been a while since her last examination. She recently had a mammogram and visited the dermatologist.    Social History:  Coffee/Tea/Caffeine-containing Drinks: She drinks coffee in the morning.  Sleep: She gets up in the night but does not specify the number of hours of sleep.    PAST SURGICAL HISTORY:  Colonoscopy: 2010, 2019      Patient Active Problem List   Diagnosis    Insomnia, unspecified    Post-menopausal    Screening mammogram, encounter for    Osteoporosis    Hyperlipidemia    Primary osteoarthritis involving multiple joints    Early cataract    Essential hypertension    Vitamin D deficiency    Left ear impacted cerumen    Osteopenia    Medicare annual wellness visit, subsequent    Polyp of colon    Alcohol use    Kyphosis, acquired    Histoplasmosis    Lower abdominal pain    Irritable bowel syndrome    Cataract of left eye    Acute midline thoracic back pain    Stress    Medicare annual wellness visit, subsequent    COVID-19 virus infection    Flu    Medicare annual wellness visit, subsequent    Medicare annual wellness visit, subsequent    Numbness and tingling in left hand    Medicare annual wellness visit, subsequent    Other fatigue    Mixed stress and urge urinary incontinence        Past Medical History:   Diagnosis Date    Arthritis     Cataract     Hyperlipidemia     Hypertension     Ovarian cyst        Past Surgical History:   Procedure Laterality Date    APPENDECTOMY       SECTION      COLONOSCOPY  2009    OVARIAN CYST SURGERY      TUBAL ABDOMINAL LIGATION Bilateral        Family History   Problem Relation Age of Onset    Breast cancer Maternal Aunt 70    Pancreatic cancer Other     COPD Other      "Diabetes Other     Diabetes Mother     Heart disease Father     Ovarian cancer Neg Hx     Uterine cancer Neg Hx     Endometrial cancer Neg Hx     Colon cancer Neg Hx        Social History     Socioeconomic History    Marital status:    Tobacco Use    Smoking status: Former     Current packs/day: 0.00     Average packs/day: 0.3 packs/day for 1.2 years (0.3 ttl pk-yrs)     Types: Cigarettes     Start date: 1963     Quit date: 1964     Years since quittin.5    Smokeless tobacco: Never    Tobacco comments:     1 CIGARETTES     Vaping Use    Vaping status: Never Used   Substance and Sexual Activity    Alcohol use: Yes     Alcohol/week: 7.0 standard drinks of alcohol     Types: 7 Glasses of wine per week    Drug use: No    Sexual activity: Not Currently     Birth control/protection: Other       No Known Allergies    Review of Systems     HEENT: Denies headache or dizzy remote cataract surgery denies hearing loss  NECK: Denies pain stiffness swelling dysphagia  CHEST: Non-smoker denies cough wheeze or shortness of breath  CARDIAC: Denies chest pain pressure/palpitations  ABD: Recent GI illness resolved approximately 2 to 3 weeks ago with fatigue feeling since the diarrhea episode  : Complains of possible discharge denies dysuria  NEURO: Denies syncope concussion neuropathy  PSYCH: Normal  EXTREM: Denies extremity pain discomfort  SKIN: Denies rash    Vital Signs  Vitals:    25 0928 25 1220   BP: 160/90 158/88   BP Location: Left arm    Patient Position: Sitting    Cuff Size: Adult    Pulse: 80    SpO2: 98%    Weight: 60.8 kg (134 lb)    Height: 162.6 cm (64\")    PainSc: 0-No pain      Body mass index is 23 kg/m².  BMI is within normal parameters. No other follow-up for BMI required.     Advance Care Planning   ACP discussion was held with the patient during this visit. Patient has an advance directive in EMR which is still valid.        Current Outpatient Medications:     cholecalciferol " (VITAMIN D3) 1000 units tablet, Take 1 tablet by mouth Daily., Disp: , Rfl:     lisinopril (PRINIVIL,ZESTRIL) 30 MG tablet, Take 1 tablet by mouth Daily., Disp: 90 tablet, Rfl: 3    Multiple Vitamins-Minerals (MULTIVITAMIN WITH MINERALS) tablet tablet, Take 1 tablet by mouth Daily., Disp: , Rfl:     Probiotic Product (PROBIOTIC-10) capsule, Take  by mouth Daily., Disp: , Rfl:     simvastatin (ZOCOR) 20 MG tablet, Take 1 tablet by mouth Every Evening., Disp: 90 tablet, Rfl: 3    temazepam (RESTORIL) 30 MG capsule, Take 1 capsule by mouth At Night As Needed for Sleep., Disp:  , Rfl:     Wheat Dextrin (Benefiber) powder, Take 1 each by mouth Daily., Disp: , Rfl:     Physical Exam     ACE III MINI        Physical Exam  Physical Exam  Cardiovascular: Regular rate and rhythm, no murmurs, rubs, or gallops  Gastrointestinal: Soft, no tenderness, no distention, no masses  HEENT: Pupils equal reactive ENT clear no facial asymmetry pharynx is clear  NECK: No mass bruit thyromegaly or neck vein distention  CHEST: Clear breath sounds without rales or wheezing  CARDIAC: Regular rhythm not gallop rub blood pressure heart rate stable in note that she has not taken her lisinopril this a.m.  ABD: Liver spleen normal positive bowel sounds no bruit  : Deferred  NEURO: Intact  PSYCH: Denies anxiety depression, normal  EXTREM: Denies extremity pain discomfort or injury or edema   Skin: No rash     Results Review:    Recent Results (from the past 4 weeks)   [facility] GENETIC RISK ASSESSMENT QUESTIONNAIRE - ,    Collection Time: 06/26/25  7:52 AM   Result Value Ref Range    TyrerCuzick 1.1     NCCN NCCN not met        Procedures arrange colonoscopy with Dr. Delvalle    Medication Review: Medications reviewed and noted    Patient wellness counseling  Exercise: Continue active ADL  Diet: Continue healthy cardiac diet  Screening: CBC CMP lipid and UA pending  Social History     Socioeconomic History    Marital status:    Tobacco Use     Smoking status: Former     Current packs/day: 0.00     Average packs/day: 0.3 packs/day for 1.2 years (0.3 ttl pk-yrs)     Types: Cigarettes     Start date: 1963     Quit date: 1964     Years since quittin.5    Smokeless tobacco: Never    Tobacco comments:     1 CIGARETTES     Vaping Use    Vaping status: Never Used   Substance and Sexual Activity    Alcohol use: Yes     Alcohol/week: 7.0 standard drinks of alcohol     Types: 7 Glasses of wine per week    Drug use: No    Sexual activity: Not Currently     Birth control/protection: Other        Assessment/Plan:    Assessment & Plan  1. Hypertension.  - Blood pressure readings are elevated today, with a reading of 170/94 mmHg.  - Did not take blood pressure medication this morning.  - Blood pressure improved to 154/88 mmHg when lying down.  - CBC, chemistry test, blood sugar, kidney function, liver function, body cells, cholesterol, and a urine microscopic look have been ordered.    2. Hyperlipidemia.  - Lipid panel has been ordered to monitor cholesterol levels.  - No new symptoms reported.  - No changes in medication effectiveness discussed.  - Continue current management plan.    3. Vitamin D deficiency.  - No new symptoms reported.  - No changes in physical exam findings or test results discussed.  - Advised to continue current vitamin D supplementation regimen.  - No new medications or therapies prescribed.    4. Fatigue.  - Reports feeling fatigued for the past 2 to 3 weeks.  - No abnormalities in heart rate (84 bpm) or oxygen saturation (98%) noted.  - CBC and CMP have been ordered to investigate potential causes.  - No new medications or therapies prescribed.    5. Recent GI issues.  - Experienced a GI bug about three weeks ago, including diarrhea and weight loss of six to seven pounds.  - No antibiotics were taken.  - Colonoscopy will be scheduled with Dr. Sammy Delvalle due to history of colon polyps and recent GI symptoms.  - No new medications  or therapies prescribed.    6. Vaginal discharge.  - Reports slight discharge and is concerned about a possible infection.  - No discomfort reported.  - Urine test has been ordered to check for a yeast infection or irritation.  - No new medications or therapies prescribed.    Follow-up: December 2025.    Problem List Items Addressed This Visit          Cardiac and Vasculature    Hyperlipidemia    Overview   History of mixed hyperlipidemia on simvastatin 20 mg daily no history of myalgia arthralgia          Current Assessment & Plan    Per lipidemia about simvastatin 20 mg every evening denies myalgia or arthralgia continue therapy  Fasting CMP and lipid are pending         Relevant Medications    simvastatin (ZOCOR) 20 MG tablet    Other Relevant Orders    CBC & Differential    Comprehensive Metabolic Panel    Lipid Panel    Urinalysis With Microscopic - Urine, Clean Catch    Essential hypertension - Primary    Overview   History of essential hypertension with current blood pressure 122/80 on lisinopril 20 mg daily denies headache or cough blood pressure stable EKG is normal          Current Assessment & Plan   Hypertension with repeated blood pressure 158/88 heart rate of 80 O2 saturation 98% on lisinopril 30 mg daily patient states she has not taken her medication this a.m. as she was traveling from [facility] on July 20 and simply forgot to take medication this a.m.  Encouraged taking medication on arrival at home today  CMP is pending         Relevant Medications    lisinopril (PRINIVIL,ZESTRIL) 30 MG tablet    Other Relevant Orders    CBC & Differential    Comprehensive Metabolic Panel    Lipid Panel    Urinalysis With Microscopic - Urine, Clean Catch       Endocrine and Metabolic    Vitamin D deficiency    Overview   History of vitamin D deficiency on vitamin D3 1000 units daily and multiple vitamins with current level normal at 35.5 suggest continue therapy.         Current Assessment & Plan   Continue  vitamin D 1000 units daily         Relevant Orders    CBC & Differential    Comprehensive Metabolic Panel    Lipid Panel    Urinalysis With Microscopic - Urine, Clean Catch       Genitourinary and Reproductive     Mixed stress and urge urinary incontinence    Overview   Patient has mild urinary stress incontinence does wear depends pad complains of very slight vaginal discharge, denies fever or discomfort or soreness  Will obtain microscopic urinalysis and suggest over-the-counter vaginal insert therapy            Symptoms and Signs    Other fatigue    Overview   Patient has complaint of recent fatigue over the past 2-1/2 to 3 weeks this was sequela of recent gastrointestinal illness with nausea mild abdominal discomfort and diarrhea which has resolved but the patient has been left with general fatigue.  The patient recently traveled to [place] for a family wedding and was gone for approximately 4 days and returned last evening,  CBC CMP and urinalysis are pending         Current Assessment & Plan   CBC CMP and urinalysis pending         Relevant Orders    CBC & Differential    Comprehensive Metabolic Panel    Lipid Panel    Urinalysis With Microscopic - Urine, Clean Catch        Patient Instructions   CBC CMP lipid and urinalysis microscopic pending  Continue current medications  Arrange colonoscopy with Dr. Sammy Delvalle last 2019  Encouraged probiotic align  Continue active ADL  Continue healthy cardiac diet  Return visit in December 2025         Plan of care reviewed with patient at the conclusion of today's visit. Education was provided regarding diagnosis, management, and any prescribed or recommended OTC medications.Patient verbalizes understanding of and agreement with management plan.         Patient or patient representative verbalized consent for the use of Ambient Listening during the visit with  Wei Skelton MD for chart documentation. 7/21/2025  17:35 EDT      Answers submitted by the patient for this  visit:  Problem not listed (Submitted on 7/14/2025)  Chief Complaint: Other medical problem  Reason for appointment: Check up

## 2025-07-21 NOTE — PATIENT INSTRUCTIONS
CBC CMP lipid and urinalysis microscopic pending  Continue current medications  Arrange colonoscopy with Dr. Sammy Delvalle last 2019  Encouraged probiotic align  Continue active ADL  Continue healthy cardiac diet  Return visit in December 2025

## 2025-07-22 ENCOUNTER — RESULTS FOLLOW-UP (OUTPATIENT)
Dept: LAB | Facility: HOSPITAL | Age: 80
End: 2025-07-22
Payer: MEDICARE

## 2025-08-07 ENCOUNTER — EXTERNAL PBMM DATA (OUTPATIENT)
Dept: PHARMACY | Facility: OTHER | Age: 80
End: 2025-08-07
Payer: MEDICARE

## 2025-08-21 ENCOUNTER — EXTERNAL PBMM DATA (OUTPATIENT)
Dept: PHARMACY | Facility: OTHER | Age: 80
End: 2025-08-21
Payer: MEDICARE